# Patient Record
Sex: MALE | Race: BLACK OR AFRICAN AMERICAN | Employment: OTHER | URBAN - METROPOLITAN AREA
[De-identification: names, ages, dates, MRNs, and addresses within clinical notes are randomized per-mention and may not be internally consistent; named-entity substitution may affect disease eponyms.]

---

## 2018-06-07 ENCOUNTER — APPOINTMENT (OUTPATIENT)
Dept: GENERAL RADIOLOGY | Facility: HOSPITAL | Age: 63
DRG: 064 | End: 2018-06-07
Attending: EMERGENCY MEDICINE
Payer: MEDICARE

## 2018-06-07 ENCOUNTER — APPOINTMENT (OUTPATIENT)
Dept: CV DIAGNOSTICS | Facility: HOSPITAL | Age: 63
DRG: 064 | End: 2018-06-07
Attending: INTERNAL MEDICINE
Payer: MEDICARE

## 2018-06-07 ENCOUNTER — APPOINTMENT (OUTPATIENT)
Dept: CT IMAGING | Facility: HOSPITAL | Age: 63
DRG: 064 | End: 2018-06-07
Attending: INTERNAL MEDICINE
Payer: MEDICARE

## 2018-06-07 ENCOUNTER — APPOINTMENT (OUTPATIENT)
Dept: CT IMAGING | Facility: HOSPITAL | Age: 63
DRG: 064 | End: 2018-06-07
Attending: EMERGENCY MEDICINE
Payer: MEDICARE

## 2018-06-07 ENCOUNTER — APPOINTMENT (OUTPATIENT)
Dept: ULTRASOUND IMAGING | Facility: HOSPITAL | Age: 63
DRG: 064 | End: 2018-06-07
Attending: INTERNAL MEDICINE
Payer: MEDICARE

## 2018-06-07 ENCOUNTER — HOSPITAL ENCOUNTER (INPATIENT)
Facility: HOSPITAL | Age: 63
LOS: 14 days | Discharge: NURSING, REHAB OR LONG-TERM CARE | DRG: 064 | End: 2018-06-21
Attending: EMERGENCY MEDICINE | Admitting: INTERNAL MEDICINE
Payer: MEDICARE

## 2018-06-07 DIAGNOSIS — R53.1 RIGHT SIDED WEAKNESS: ICD-10-CM

## 2018-06-07 DIAGNOSIS — I61.9 LEFT-SIDED NONTRAUMATIC INTRACEREBRAL HEMORRHAGE, UNSPECIFIED CEREBRAL LOCATION (HCC): Primary | ICD-10-CM

## 2018-06-07 DIAGNOSIS — N17.9 AKI (ACUTE KIDNEY INJURY) (HCC): ICD-10-CM

## 2018-06-07 PROCEDURE — 93880 EXTRACRANIAL BILAT STUDY: CPT | Performed by: INTERNAL MEDICINE

## 2018-06-07 PROCEDURE — 70450 CT HEAD/BRAIN W/O DYE: CPT | Performed by: EMERGENCY MEDICINE

## 2018-06-07 PROCEDURE — 30233R1 TRANSFUSION OF NONAUTOLOGOUS PLATELETS INTO PERIPHERAL VEIN, PERCUTANEOUS APPROACH: ICD-10-PCS | Performed by: INTERNAL MEDICINE

## 2018-06-07 PROCEDURE — 99223 1ST HOSP IP/OBS HIGH 75: CPT | Performed by: OTHER

## 2018-06-07 PROCEDURE — 93306 TTE W/DOPPLER COMPLETE: CPT | Performed by: INTERNAL MEDICINE

## 2018-06-07 PROCEDURE — 99223 1ST HOSP IP/OBS HIGH 75: CPT | Performed by: INTERNAL MEDICINE

## 2018-06-07 PROCEDURE — 71045 X-RAY EXAM CHEST 1 VIEW: CPT | Performed by: EMERGENCY MEDICINE

## 2018-06-07 PROCEDURE — 71250 CT THORAX DX C-: CPT | Performed by: INTERNAL MEDICINE

## 2018-06-07 RX ORDER — SODIUM CHLORIDE 0.9 % (FLUSH) 0.9 %
10 SYRINGE (ML) INJECTION AS NEEDED
Status: DISCONTINUED | OUTPATIENT
Start: 2018-06-07 | End: 2018-06-21

## 2018-06-07 RX ORDER — AMLODIPINE BESYLATE 10 MG/1
10 TABLET ORAL DAILY
Status: DISCONTINUED | OUTPATIENT
Start: 2018-06-07 | End: 2018-06-11

## 2018-06-07 RX ORDER — HYDRALAZINE HYDROCHLORIDE 20 MG/ML
10 INJECTION INTRAMUSCULAR; INTRAVENOUS ONCE
Status: COMPLETED | OUTPATIENT
Start: 2018-06-07 | End: 2018-06-07

## 2018-06-07 RX ORDER — CARVEDILOL 12.5 MG/1
12.5 TABLET ORAL 2 TIMES DAILY WITH MEALS
COMMUNITY

## 2018-06-07 RX ORDER — FAMOTIDINE 20 MG/1
20 TABLET ORAL DAILY
Status: DISCONTINUED | OUTPATIENT
Start: 2018-06-07 | End: 2018-06-21

## 2018-06-07 RX ORDER — HYDRALAZINE HYDROCHLORIDE 50 MG/1
50 TABLET, FILM COATED ORAL 3 TIMES DAILY
Status: DISCONTINUED | OUTPATIENT
Start: 2018-06-07 | End: 2018-06-08

## 2018-06-07 RX ORDER — FAMOTIDINE 20 MG/1
20 TABLET ORAL DAILY
COMMUNITY

## 2018-06-07 RX ORDER — SODIUM CHLORIDE 0.9 % (FLUSH) 0.9 %
3 SYRINGE (ML) INJECTION AS NEEDED
Status: DISCONTINUED | OUTPATIENT
Start: 2018-06-07 | End: 2018-06-21

## 2018-06-07 RX ORDER — CARVEDILOL 12.5 MG/1
12.5 TABLET ORAL 2 TIMES DAILY WITH MEALS
Status: DISCONTINUED | OUTPATIENT
Start: 2018-06-07 | End: 2018-06-21

## 2018-06-07 RX ORDER — ATORVASTATIN CALCIUM 40 MG/1
40 TABLET, FILM COATED ORAL NIGHTLY
Status: DISCONTINUED | OUTPATIENT
Start: 2018-06-07 | End: 2018-06-21

## 2018-06-07 RX ORDER — POTASSIUM CHLORIDE 20 MEQ/1
40 TABLET, EXTENDED RELEASE ORAL DAILY
Status: DISCONTINUED | OUTPATIENT
Start: 2018-06-07 | End: 2018-06-16

## 2018-06-07 RX ORDER — SODIUM BICARBONATE 650 MG/1
650 TABLET ORAL 2 TIMES DAILY
Status: DISCONTINUED | OUTPATIENT
Start: 2018-06-07 | End: 2018-06-21

## 2018-06-07 RX ORDER — POTASSIUM CHLORIDE 20 MEQ/1
40 TABLET, EXTENDED RELEASE ORAL DAILY
COMMUNITY

## 2018-06-07 RX ORDER — HYDRALAZINE HYDROCHLORIDE 25 MG/1
25 TABLET, FILM COATED ORAL 3 TIMES DAILY
Status: DISCONTINUED | OUTPATIENT
Start: 2018-06-07 | End: 2018-06-07

## 2018-06-07 RX ORDER — LEVETIRACETAM 500 MG/1
500 TABLET ORAL 2 TIMES DAILY
Status: DISCONTINUED | OUTPATIENT
Start: 2018-06-07 | End: 2018-06-16

## 2018-06-07 RX ORDER — SODIUM CHLORIDE 9 MG/ML
INJECTION, SOLUTION INTRAVENOUS CONTINUOUS
Status: DISCONTINUED | OUTPATIENT
Start: 2018-06-07 | End: 2018-06-07

## 2018-06-07 RX ORDER — SODIUM CHLORIDE 450 MG/100ML
INJECTION, SOLUTION INTRAVENOUS CONTINUOUS
Status: DISCONTINUED | OUTPATIENT
Start: 2018-06-07 | End: 2018-06-08

## 2018-06-07 RX ORDER — LEVETIRACETAM 500 MG/1
500 TABLET ORAL 2 TIMES DAILY
COMMUNITY

## 2018-06-07 RX ORDER — ATORVASTATIN CALCIUM 40 MG/1
40 TABLET, FILM COATED ORAL NIGHTLY
COMMUNITY

## 2018-06-07 RX ORDER — ASPIRIN 81 MG/1
TABLET, CHEWABLE ORAL DAILY
Status: ON HOLD | COMMUNITY
End: 2018-06-11

## 2018-06-07 RX ORDER — SODIUM BICARBONATE 650 MG/1
650 TABLET ORAL 2 TIMES DAILY
COMMUNITY

## 2018-06-07 RX ORDER — AMLODIPINE BESYLATE 10 MG/1
10 TABLET ORAL DAILY
Status: ON HOLD | COMMUNITY
End: 2018-06-11

## 2018-06-07 RX ORDER — CHOLECALCIFEROL (VITAMIN D3) 1250 MCG
50000 CAPSULE ORAL WEEKLY
COMMUNITY

## 2018-06-07 RX ORDER — HYDRALAZINE HYDROCHLORIDE 25 MG/1
25 TABLET, FILM COATED ORAL 3 TIMES DAILY
Status: ON HOLD | COMMUNITY
End: 2018-06-11

## 2018-06-07 RX ORDER — SODIUM CHLORIDE 9 MG/ML
INJECTION, SOLUTION INTRAVENOUS ONCE
Status: COMPLETED | OUTPATIENT
Start: 2018-06-07 | End: 2018-06-07

## 2018-06-07 NOTE — PROGRESS NOTES
Tried to call guardian three times since 7:30 am, phone number in chart is to office, unable to leave voicemail because phone continues to ring and ring, no voicemail set [phone].

## 2018-06-07 NOTE — CM/SW NOTE
CARE MANAGEMENT    Discharge Planning Evaluation:  Saw pt at bedside sitting up in a chair, nonverbal. Awake, alert, nods yes to everything. Bedside RN asked me about obtaining records from [address].   Pt is reported nonverbal, admitted with

## 2018-06-07 NOTE — SLP NOTE
ADULT SWALLOWING EVALUATION    ASSESSMENT    ASSESSMENT/OVERALL IMPRESSION:  Pt seen sitting upright in bed for all PO trials for BSSE. Pt reportedly with hx of dysphagia of unknown extent.  Pt remained nonverbal during the session and gave nonverbal affirm Stroke protocol    Problem List  Principal Problem:    Left-sided nontraumatic intracerebral hemorrhage, unspecified cerebral location [facility])  Active Problems:    Right sided weakness    BLAS (acute kidney injury) [facility])      Past Medical History  Past Medica required to rule-out silent aspiration.)    Esophageal Phase of Swallow: No complaints consistent with possible esophageal involvement    GOALS  Goal #1 The patient will tolerate puree consistency and nectar liquids without overt signs or symptoms of aspir

## 2018-06-07 NOTE — ED PROVIDER NOTES
Patient Seen in: [facility] AND [facility] Emergency Department    History   Patient presents with:  Stroke (neurologic)    Stated Complaint: stroke     HPI    History is provided by EMS.     80-year-old male with history of CKD, communication disorder, GERD, HTN regular rhythm. No murmur heard. 2+ radial and DP pulses b/l, no leg swelling   Pulmonary/Chest: Effort normal and breath sounds normal. No stridor. No respiratory distress. He has no wheezes. He has no rales. Abdominal: Soft.  He exhibits no distensi BUN/CREA Ratio 8.6 (L) 10.0 - 20.0   Anion Gap 9 0 - 18 mmol/L   Calculated Osmolality 310 (H) 275 - 295 mOsm/kg   GFR, Non-African American 12 (L) >=60   GFR, -American 14 (L) >=60   -CBC W/ DIFFERENTIAL   Collection Time: 06/07/18  4:39 AM   Res ventriculomegaly. Case discussed with Nereyda De La Fuente RN, at 06/07/2018 at 05:52 Yo Beard M.D. This report has been electronically signed and verified by the Radiologist whose name is printed above.     DD:  06/07/2018/DT:  06/07/2018 Care Time:  Total critical care time for this patient was 50 minutes exclusive of separately billable procedures, but in obtaining history, performing a physical exam, bedside monitoring of interventions, collecting and interpreting test, and discussion wi

## 2018-06-07 NOTE — H&P
[address] Patient Status:  Inpatient    1955 MRN [id number]   [facility] 2W/SW Attending Yoli Epps, *   Hosp Day # 0 PCP Mia Jordan MD     Date:  2018 sodium bicarbonate 650 MG Oral Tab Take 650 mg by mouth 2 (two) times daily. Cholecalciferol (VITAMIN D3) 44384 units Oral Cap Take 50,000 Units by mouth once a week. atorvastatin 40 MG Oral Tab Take 40 mg by mouth nightly.    Potassium Chloride ER 20 loss with sequela of chronic microvascular ischemic disease. 5. There is anterior and posterior circulation large vessel atherosclerosis. 6. Lesser incidental findings as above.     A preliminary report was issued by the [address] [place] [address] Radiology teleradiology ser imaging. 4. Subpleural left lung micronodules under 4 mm or less in size, which are likely benign. 5. Cardiomegaly with coronary atherosclerosis. 6. Small hiatal hernia. 7. Probable cholelithiasis, which is only partially imaged.  8. Severe T6 as well as mi clinical documentation in H+P. Based on patients current state of illness, I anticipate that, after discharge, patient will require TBD.       Arturo Schaefer MD  6/7/2018

## 2018-06-07 NOTE — CM/SW NOTE
SW informed the pt is from [facility] [phone]. [facility] has requested clinical information and the advance directive/guardianship papers for the pt.      ANNA placed call to [place] [address] office [phone] who confirmed Kurtis Becerra as the pt's Publ

## 2018-06-07 NOTE — PROGRESS NOTES
[facility]    Report of Consultation    Date of Admission:  6/7/2018  Date of Consult:  6/7/2018     Reason for Consultation:     BLAS and Uncontrolled HTN     History of Present Illness:     Ludivina Nicole is a 61 yrs male with pmh of CKD sta levETIRAcetam (KEPPRA) tab 500 mg 500 mg Oral BID   Potassium Chloride ER (K-DUR M20) CR tab 40 mEq 40 mEq Oral Daily   sodium bicarbonate tab 650 mg Oral BID   Normal Saline Flush 0.9 % injection 3 mL 3 mL Intravenous PRN   Normal Saline Flush 0.9 % inj hours.     Lab Results  Component Value Date   COLORUR Yellow 06/07/2018   CLARITY Hazy 06/07/2018   SPECGRAVITY 1.010 06/07/2018   GLUUR 50  06/07/2018   BILUR Negative 06/07/2018   KETUR Negative 06/07/2018   BLOODURINE Negative 06/07/2018   PHURINE 7.0 0

## 2018-06-07 NOTE — CONSULTS
[facility]    Neurosurgery Consultation    John Crews Patient Status:  Inpatient    1955 MRN [id number]   Location [facility] 2W/SW Attending Heladio Gambino, *   Hosp Day # 0 PCP Bill Vidales MD     Date of with no rebound or guarding. No peritoneal signs. Extremities:  Non-tender, no lower extremity edema noted.       Labs:    Lab Results  Component Value Date   WBC 6.7 06/07/2018   HGB 13.0 (L) 06/07/2018    06/07/2018   BUN 40 (H) 06/07/2018   N hypertensive left thalamic ICH, nonoperative. Recommend management per neurology, BP management. Because of ASA use, we recommend platelet transfusion. No neurosurgical issues at this time.   Repeat CTH early am.    More than 60 minutes were spent in con

## 2018-06-07 NOTE — PLAN OF CARE
Problem: CARDIOVASCULAR - ADULT  Goal: Maintains optimal cardiac output and hemodynamic stability  INTERVENTIONS:  - Monitor vital signs, rhythm, and trends  - Monitor for bleeding, hypotension and signs of decreased cardiac output  - Evaluate effectivenes FALL  Goal: Free from fall injury  INTERVENTIONS:  - Assess pt frequently for physical needs  - Identify cognitive and physical deficits and behaviors that affect risk of falls.   - [place] fall precautions as indicated by assessment.  - Educate pt/family

## 2018-06-07 NOTE — CONSULTS
[facility]    Consult Note    Date:  6/7/2018  Date of Admission:  6/7/2018      Chief Complaint:   Quentin Healy is a(n) 61year old male with intracranial hemorrhage.     HPI:   The patient lives at [place] [address] and has significant debil levETIRAcetam 500 MG Oral Tab Take 500 mg by mouth 2 (two) times daily. Review of Systems:   Cannot obtain    Physical Exam:   Vital Signs:  Blood pressure 133/72, pulse 85, temperature 98.1 °F (36.7 °C), resp.  rate 18, weight 137 lb 12.6 oz (62.5 left lung. Close followup suggested with option for CT. 4. Demineralization. 5. Scoliosis. 6. Osteoarthritis. 7. A preliminary report was submitted and there are no major discrepancies. Assessment/Plan:   1.   Intracranial hemorrhage–likely hype

## 2018-06-07 NOTE — CONSULTS
Neurology Inpatient Consult Note    Jillian Alas : 1955   Referring Physician: Dr. Ilir Figueroa  HPI:     Jillian Alas is a 61year old male who is being seen in neurologic evaluation. Patient being seen in evaluation for intracranial hemorrhage.   He Unable to accurately assess  Reflexes: DTRs 3+ in bilateral biceps, brachioradialis, patella, toes upgoing bilaterally  Coordination / gait: Unable to assess    IMAGING / STUDIES:  reviewed   Noncontrast head CT 6/7/18, images and report reviewed  CONCLUSI

## 2018-06-08 ENCOUNTER — APPOINTMENT (OUTPATIENT)
Dept: GENERAL RADIOLOGY | Facility: HOSPITAL | Age: 63
DRG: 064 | End: 2018-06-08
Attending: INTERNAL MEDICINE
Payer: MEDICARE

## 2018-06-08 ENCOUNTER — APPOINTMENT (OUTPATIENT)
Dept: CT IMAGING | Facility: HOSPITAL | Age: 63
DRG: 064 | End: 2018-06-08
Attending: Other
Payer: MEDICARE

## 2018-06-08 PROCEDURE — 71045 X-RAY EXAM CHEST 1 VIEW: CPT | Performed by: INTERNAL MEDICINE

## 2018-06-08 PROCEDURE — 70450 CT HEAD/BRAIN W/O DYE: CPT | Performed by: OTHER

## 2018-06-08 PROCEDURE — 99233 SBSQ HOSP IP/OBS HIGH 50: CPT | Performed by: INTERNAL MEDICINE

## 2018-06-08 PROCEDURE — 99232 SBSQ HOSP IP/OBS MODERATE 35: CPT | Performed by: OTHER

## 2018-06-08 RX ORDER — DEXTROSE MONOHYDRATE 50 MG/ML
INJECTION, SOLUTION INTRAVENOUS CONTINUOUS
Status: DISCONTINUED | OUTPATIENT
Start: 2018-06-08 | End: 2018-06-12

## 2018-06-08 RX ORDER — SPIRONOLACTONE 25 MG/1
25 TABLET ORAL DAILY
Status: DISCONTINUED | OUTPATIENT
Start: 2018-06-08 | End: 2018-06-14

## 2018-06-08 RX ORDER — HYDRALAZINE HYDROCHLORIDE 20 MG/ML
10 INJECTION INTRAMUSCULAR; INTRAVENOUS ONCE
Status: COMPLETED | OUTPATIENT
Start: 2018-06-08 | End: 2018-06-08

## 2018-06-08 RX ORDER — LABETALOL HYDROCHLORIDE 5 MG/ML
10 INJECTION, SOLUTION INTRAVENOUS
Status: DISCONTINUED | OUTPATIENT
Start: 2018-06-08 | End: 2018-06-21

## 2018-06-08 NOTE — OCCUPATIONAL THERAPY NOTE
OCCUPATIONAL THERAPY EVALUATION - INPATIENT     Room Number: 325/325-A  Evaluation Date: 6/8/2018  Type of Evaluation: Initial  Presenting Problem: L sided ICH     Physician Order: IP Consult to Occupational Therapy  Reason for Therapy: ADL/IADL Dysfunctio unable to report PLOF; per RN, patient is close to his cognitive baseline. Patient resides at [facility] and has 24/7 care and spv.  Continued skilled OT in supervised setting is recommended at this time; will adjust OT POC as appropriate and as furth ASSESSMENT  Rating: Unable to rate          ACTIVITY TOLERANCE  Room air    COGNITION  Overall Cognitive Status:  Impaired  Attention Span:  difficulty attending to directions  Following Commands:  follows one step commands with increased time and follows (sit to supine = min A)  Sit to Stand:  (mod A x2)  Toilet Transfer: NT  Shower Transfer: NT  Chair Transfer: mod A x2    Bedroom Mobility: mod A x2 and RW    BALANCE ASSESSMENT  Static Sitting: fair  Dynamic Sitting: fair-  Static Standing: poor+  Dynamic

## 2018-06-08 NOTE — PROGRESS NOTES
[facility] - [facility]    Progress Note      Subjective:     Patient sitting comfortably - ate almost 80% of his meal    Review of Systems:     Unable to obtain - non verbal     Objective:   Temp:  [96.4 °F (35.8 °C)-99.4 °F (37.4 °C)] 96.4 °F (35.8 13.0*  11.2*   HCT  37.9*  32.4*   MCV  93.0  92.5   WBC  6.7  7.3   PLT  224  236     Recent Labs   Lab  06/07/18   0439  06/08/18   0425   GLU  104*  109*   BUN  40*  41*   CREATSERUM  4.66*  4.42*   GFRAA  14*  15*   GFRNAA  12*  13*   CA  9.1  8.5   NA disease. 5. There is anterior and posterior circulation large vessel atherosclerosis. 6. Lesser incidental findings as above. A preliminary report was issued by the [address] [place] [address] Radiology teleradiology service. There are no major discrepancies.    Dictated b have represented gynecomastia. 3. Additional linear airspace opacities in both lower lobes with configuration most suggestive of atelectasis. Bibasilar peribronchial thickening raises suspicion for component of bronchitis.  These findings can be reassessed attempt to weanoff   - on coreg, amlodipine and hydralazine at home. Added spironolactone to 25 mg daily dose. Increase hydralazine to home dose of 75 mg TID      Discussed with Nursing. Records reviewed from [facility]. Patient was seeing a nephrologist - cont. t

## 2018-06-08 NOTE — PLAN OF CARE
CARDIOVASCULAR - ADULT    • Maintains optimal cardiac output and hemodynamic stability Progressing    • Absence of cardiac arrhythmias or at baseline Progressing    Monitor reveals NSR, BP is normotensive.      NEUROLOGICAL - ADULT    • Achieves stable or i

## 2018-06-08 NOTE — CM/SW NOTE
Clinical updates sent via [email or web address] to [facility] [phone].     KVNG koenig [id number]

## 2018-06-08 NOTE — PHYSICAL THERAPY NOTE
PHYSICAL THERAPY EVALUATION - INPATIENT     Room Number: 463/700-R  Evaluation Date: 6/8/2018  Type of Evaluation: Initial   Physician Order: PT Eval and Treat    Presenting Problem: p/w R sided weakness  Reason for Therapy: Mobility Dysfunction and Disch follow and modify recommendations as appropriate. Currently, PT recommendation sub-acute rehab upon d/c from hospital.     Patient will benefit from continued IP PT services to address these deficits in preparation for discharge.     DISCHARGE RECOMMENDATIO Unable to rate     COGNITION  · Overall Cognitive Status:  Impaired  · Memory:  unable to assess  · Following Commands:  follows one-step commands inconsistently, follows multi-step commands inconsistently, follows one step commands with increased time and from lying on back to sitting on the side of the bed?: A Lot   How much help from another person does the patient currently need. ..   -   Moving to and from a bed to a chair (including a wheelchair)?: A Lot   -   Need to walk in hospital room?: A Lot   - Patient to demonstrate independence with home activity/exercise instructions provided to patient in preparation for discharge.    Goal #4   Current Status    Goal #5    Goal #5   Current Status    Goal #6    Goal #6  Current Status

## 2018-06-08 NOTE — PROGRESS NOTES
[facility]    NEUROSURGERY PROGRESS NOTE    Fran Cooney Patient Status:  Inpatient    1955 MRN [id number]   [facility] 2W/SW Attending Sen Gonzalez, *   Hosp Day # 1 PCP Leeanne Zhao MD       S: 6. Lesser incidental findings as above. A preliminary report was issued by the [address] [place] [address] Radiology teleradiology service. There are no major discrepancies.    Dictated by (CST): Lance Dandy, MD on 6/07/2018 at 6:21     Approved by (CST): Lance Dandy, lobes with configuration most suggestive of atelectasis. Bibasilar peribronchial thickening raises suspicion for component of bronchitis. These findings can be reassessed at the time of anticipated followup imaging.  4. Subpleural left lung micronodules und

## 2018-06-08 NOTE — PROGRESS NOTES
Neurology Inpatient Follow-up Note      HPI:     Pt being seen in follow up. Interval notes reviewed.        Past Medical Hisotory:  Reviewed    Medications:  Reviewed    Allergies:    Dilantin [Phenytoin]    UNKNOWN      ROS:   Unable to obtain from percy Left atrium: Bubble study is inconclusive. No previous study was available for comparison. LDL 78    ASSESSMENT/PLAN:   Intracranial hemorrhage  Left thalamus/basal ganglia.   Most consistent with hypertensive hemorrhage due to location and clinical c

## 2018-06-08 NOTE — PROGRESS NOTES
[facility]     Progress Note        Jillian Alas Patient Status:  Inpatient    1955 MRN [id number]   Location [facility] 2W/SW Attending Joe Saini, *   Hosp Day # 1 PCP Mohan Reyes MD       Subjective: 06/08/2018   HGB 11.2 06/08/2018   HCT 32.4 06/08/2018    06/08/2018   CREATSERUM 4.42 06/08/2018   BUN 41 06/08/2018    06/08/2018   K 3.8 06/08/2018    06/08/2018   CO2 21 06/08/2018    06/08/2018   CA 8.5 06/08/2018   INR 1.0 0 Portable  (cpt=71045)    Result Date: 6/8/2018  CONCLUSION:  1. Mild cardiomegaly. Tortuous aorta 2.  Perihilar and bibasilar subsegmental atelectasis versus multifocal parenchymal opacification    Dictated by (CST): María Pinedo MD on 6/08/2018 at 7 of comparison imaging. They do, however, demonstrate nonacute CT features. 9. Lesser incidental findings as above.      Dictated by (CST): Montrell Ivy MD on 6/07/2018 at 13:18     Approved by (CST): Montrell Ivy MD on 6/07/2018 at 13:34            Ek

## 2018-06-09 PROCEDURE — 99232 SBSQ HOSP IP/OBS MODERATE 35: CPT | Performed by: INTERNAL MEDICINE

## 2018-06-09 PROCEDURE — 99233 SBSQ HOSP IP/OBS HIGH 50: CPT | Performed by: INTERNAL MEDICINE

## 2018-06-09 PROCEDURE — 99233 SBSQ HOSP IP/OBS HIGH 50: CPT | Performed by: OTHER

## 2018-06-09 RX ORDER — 0.9 % SODIUM CHLORIDE 0.9 %
VIAL (ML) INJECTION
Status: DISPENSED
Start: 2018-06-09 | End: 2018-06-10

## 2018-06-09 NOTE — PROGRESS NOTES
[facility]    Progress Note      Assessment and Plan:   1. Intracranial hemorrhage–likely hypertensive. The patient was on aspirin. The patient is a little bit more awake and more interactive. The carotid ultrasound was negative.   The e 06/09/2018   CREATSERUM 4.20 06/09/2018   BUN 36 06/09/2018    06/09/2018   K 3.6 06/09/2018    06/09/2018   CO2 23 06/09/2018    06/09/2018   CA 9.0 06/09/2018     CT scan the chest–1.  Groundglass density nodular opacities in the left u

## 2018-06-09 NOTE — CM/SW NOTE
6/10 848am- PMR is recommending subacute rehab. [facility]    ------------------------------  6/9 1110am- MD order received regarding discharge planning. The pt.  Was admitted from [facility] and plan is to return when medically

## 2018-06-09 NOTE — PROGRESS NOTES
[facility]    Progress Note    Isak Menard Patient Status:  Inpatient    1955 MRN [id number]   Location [facility] 3W/SW Attending Johnnie La, *   Hosp Day # 2 PCP Chintan Ying MD       Subjective:   Diego Payor pain or [place]; no palpitations   GI: denies nausea, vomiting, constipation, diarrhea; no heartburn  GENITAL/: no dysuria, urgency or frequency; no nocturia  MUSCULOSKELETAL: no joint complaints upper or lower extremities  PSYCHE:no depression or anxiety  N have represented gynecomastia. 3. Additional linear airspace opacities in both lower lobes with configuration most suggestive of atelectasis. Bibasilar peribronchial thickening raises suspicion for component of bronchitis.  These findings can be reassessed

## 2018-06-09 NOTE — CONSULTS
[facility] Patient Status:  Inpatient    1955 MRN [id number]   Location [facility] 3W/SW Attending Rocco Phillip, *   Hosp Day # 2 PCP Kimberly Astudillo MD     Patient Identification  Marc Gutierrez is a 61 year Doppler parameters are consistent with abnormal left ventricular     relaxation (grade 1 diastolic dysfunction). 2. Mitral valve: Mild regurgitation. 3. Left atrium: Bubble study is inconclusive    Seen by SLP.  On Pureed/thins    Physiatry consult obtain Normal Saline Flush 0.9 % injection 10 mL 10 mL Intravenous PRN   [COMPLETED] 0.9%  NaCl infusion  Intravenous Once   [COMPLETED] hydrALAzine HCl (APRESOLINE) injection 10 mg 10 mg Intravenous Once          Smoking status: Unknown If Ever Smoked    Smoke enlarged. Bowel sounds present. Musculoskeletal:     Right Upper Extremity:  Strength is 3-4. ROM WNL. Left Upper Extremity:  Strength is 4-5. ROM WNL. Right Lower Extremity:  Strength  is 3-4. ROM WNL.    Left Lower Extremity: [facility]

## 2018-06-09 NOTE — PLAN OF CARE
CARDIOVASCULAR - ADULT    • Maintains optimal cardiac output and hemodynamic stability Progressing    • Absence of cardiac arrhythmias or at baseline Progressing        HEMATOLOGIC - ADULT    • Free from bleeding injury Progressing        NEUROLOGICAL - AD

## 2018-06-10 ENCOUNTER — APPOINTMENT (OUTPATIENT)
Dept: ULTRASOUND IMAGING | Facility: HOSPITAL | Age: 63
DRG: 064 | End: 2018-06-10
Attending: INTERNAL MEDICINE
Payer: MEDICARE

## 2018-06-10 PROCEDURE — 76770 US EXAM ABDO BACK WALL COMP: CPT | Performed by: INTERNAL MEDICINE

## 2018-06-10 PROCEDURE — 99233 SBSQ HOSP IP/OBS HIGH 50: CPT | Performed by: OTHER

## 2018-06-10 PROCEDURE — 99232 SBSQ HOSP IP/OBS MODERATE 35: CPT | Performed by: INTERNAL MEDICINE

## 2018-06-10 NOTE — PROGRESS NOTES
[facility]    Progress Note    Quentin Healy Patient Status:  Inpatient    1955 MRN [id number]   Location [facility] 3W/SW Attending Kiara Frost, *   Hosp Day # 2 PCP Luz Foreman MD       Subjective:   Amador Khalil ROM all extremities good strength  no deformities  Extremities: no edema, cyanosis  Neurological:  Poor speech, R sided weakness. Results:     Laboratory Data:    Lab Results  Component Value Date   WBC 6.6 06/09/2018   HGB 11.8 (L) 06/09/2018   HCT 34.

## 2018-06-10 NOTE — PROGRESS NOTES
[facility]    Progress Note    Jillian Alas Patient Status:  Inpatient    1955 MRN [id number]   Location [facility] 3W/SW Attending Joe Saini, *   Hosp Day # 3 PCP Mohan Reyes MD       SUBJECTIVE:  Non Oral BID with meals   famoTIDine (PEPCID) tab 20 mg 20 mg Oral Daily   levETIRAcetam (KEPPRA) tab 500 mg 500 mg Oral BID   Potassium Chloride ER (K-DUR M20) CR tab 40 mEq 40 mEq Oral Daily   sodium bicarbonate tab 650 mg Oral BID   Normal Saline Flush 0.9

## 2018-06-10 NOTE — OCCUPATIONAL THERAPY NOTE
OCCUPATIONAL THERAPY TREATMENT NOTE - INPATIENT        Room Number: 325/325-A           Presenting Problem: L sided ICH     Problem List  Principal Problem:    Left-sided nontraumatic intracerebral hemorrhage, unspecified cerebral location [facility])  Active Pr Recommendations: TBD     PLAN  OT Treatment Plan: Balance activities; Energy conservation/work simplification techniques;ADL training;Functional transfer training;UE strengthening/ROM; Patient/Family education;Patient/Family training    SUBJECTIVE  Non-verba functional transfer with min A. Comment: mod A x2    Patient will complete OFH task sitting at EOB w/ set-up A. Comment: NT    Patient will tolerate standing for 4 minutes in prep for adls with min A.     Comment: ~2 minutes of standing at a time w/ mod

## 2018-06-10 NOTE — PLAN OF CARE
Problem: CARDIOVASCULAR - ADULT  Goal: Maintains optimal cardiac output and hemodynamic stability  INTERVENTIONS:  - Monitor vital signs, rhythm, and trends  - Monitor for bleeding, hypotension and signs of decreased cardiac output  - Evaluate effectivenes oral hygiene regimen  - Implement oral medicated treatments as ordered   Outcome: Progressing  Oral care provided q2h and prn. Oral hydration offered q1h.     Problem: NEUROLOGICAL - ADULT  Goal: Achieves stable or improved neurological status  INTERVENTION that affect risk of falls.   - [place] fall precautions as indicated by assessment.  - Educate pt/family on patient safety including physical limitations  - Instruct pt to call for assistance with activity based on assessment  - Modify environment to redu

## 2018-06-10 NOTE — PROGRESS NOTES
[facility]    Progress Note    Marva Mohs Patient Status:  Inpatient    1955 MRN [id number]   Location [facility] 3W/SW Attending Jose Echols, *   Hosp Day # 3 PCP Girish Perez MD       SUBJECTIVE:  More 12.5 mg Oral BID with meals   famoTIDine (PEPCID) tab 20 mg 20 mg Oral Daily   levETIRAcetam (KEPPRA) tab 500 mg 500 mg Oral BID   Potassium Chloride ER (K-DUR M20) CR tab 40 mEq 40 mEq Oral Daily   sodium bicarbonate tab 650 mg Oral BID   Normal Saline Fl

## 2018-06-10 NOTE — PROGRESS NOTES
[facility]     Progress Note        Paty Lyons Patient Status:  Inpatient    1955 MRN [id number]   Location [facility] 2W/SW Attending Gladys Watson, *   Hosp Day # 3 PCP Rajwinder Dennis MD       Subjective: 6.4 06/10/2018   HGB 11.7 06/10/2018   HCT 34.1 06/10/2018    06/10/2018   CREATSERUM 4.16 06/10/2018   BUN 38 06/10/2018    06/10/2018   K 3.7 06/10/2018    06/10/2018   CO2 24 06/10/2018    06/10/2018   CA 8.9 06/10/2018   ALB 2

## 2018-06-10 NOTE — PROGRESS NOTES
[place] [facility]    Progress Note    Quentin Healy Patient Status:  Inpatient    1955 MRN [id number]   Location [facility] 3W/SW Attending Kiara Frost, *   Hosp Day # 3 PCP Luz Foreman MD       SUBJECTIVE:  More 12.5 mg Oral BID with meals   famoTIDine (PEPCID) tab 20 mg 20 mg Oral Daily   levETIRAcetam (KEPPRA) tab 500 mg 500 mg Oral BID   Potassium Chloride ER (K-DUR M20) CR tab 40 mEq 40 mEq Oral Daily   sodium bicarbonate tab 650 mg Oral BID   Normal Saline Fl

## 2018-06-10 NOTE — PROGRESS NOTES
[facility]    Progress Note    Jillian Alas Patient Status:  Inpatient    1955 MRN [id number]   Location [facility] 3W/SW Attending Joe Saini, *   Hosp Day # 3 PCP Mohan Reyes MD       Subjective:   Hershal Adjutant urgency or frequency; no nocturia  MUSCULOSKELETAL: no joint complaints upper or lower extremities  PSYCHE:no depression or anxiety  NEURO: As in HPI    Results:     Lab Results  Component Value Date   WBC 6.4 06/10/2018   HGB 11.7 (L) 06/10/2018   HCT 34.

## 2018-06-10 NOTE — PHYSICAL THERAPY NOTE
PHYSICAL THERAPY TREATMENT NOTE - INPATIENT     Room Number: 325/325-A           Problem List  Principal Problem:    Left-sided nontraumatic intracerebral hemorrhage, unspecified cerebral location [facility])  Active Problems:    Right sided weakness    BLAS (acu Moving from lying on back to sitting on the side of the bed?: A Lot   How much help from another person does the patient currently need. ..   -   Moving to and from a bed to a chair (including a wheelchair)?: A Lot   -   Need to walk in hospital room?: A Lo mod A x 2  New goal:  Pt to ambulate 5' bed<>chair with R.W and Min A   Goal #4 Patient to demonstrate independence with home activity/exercise instructions provided to patient in preparation for discharge.    Goal #4   Current Status  AAROM required today

## 2018-06-11 ENCOUNTER — APPOINTMENT (OUTPATIENT)
Dept: CT IMAGING | Facility: HOSPITAL | Age: 63
DRG: 064 | End: 2018-06-11
Attending: UROLOGY
Payer: MEDICARE

## 2018-06-11 PROCEDURE — 99233 SBSQ HOSP IP/OBS HIGH 50: CPT | Performed by: OTHER

## 2018-06-11 PROCEDURE — 99233 SBSQ HOSP IP/OBS HIGH 50: CPT | Performed by: INTERNAL MEDICINE

## 2018-06-11 PROCEDURE — 99223 1ST HOSP IP/OBS HIGH 75: CPT | Performed by: UROLOGY

## 2018-06-11 PROCEDURE — 74176 CT ABD & PELVIS W/O CONTRAST: CPT | Performed by: UROLOGY

## 2018-06-11 RX ORDER — SPIRONOLACTONE 25 MG/1
25 TABLET ORAL DAILY
Qty: 30 TABLET | Refills: 0 | Status: SHIPPED | OUTPATIENT
Start: 2018-06-11

## 2018-06-11 RX ORDER — NIFEDIPINE 60 MG/1
60 TABLET, EXTENDED RELEASE ORAL DAILY
Status: DISCONTINUED | OUTPATIENT
Start: 2018-06-11 | End: 2018-06-19

## 2018-06-11 RX ORDER — HYDRALAZINE HYDROCHLORIDE 25 MG/1
75 TABLET, FILM COATED ORAL 3 TIMES DAILY
Qty: 30 TABLET | Refills: 0 | Status: SHIPPED | OUTPATIENT
Start: 2018-06-11

## 2018-06-11 RX ORDER — NIFEDIPINE 60 MG/1
60 TABLET, FILM COATED, EXTENDED RELEASE ORAL DAILY
Qty: 30 TABLET | Refills: 0 | Status: SHIPPED | OUTPATIENT
Start: 2018-06-11

## 2018-06-11 NOTE — CONSULTS
[facility] [place] [facility]  Male Inpatient Consult    Amy Mckeon Patient Status:  Inpatient    1955 MRN [id number]   Location [facility] 3W/SW Attending Srikanth Dejesus.  [address] Day # 4 PCP Consuelo Rocha MD     DATE OF ADMISSION: 2018    URO spironolactone (ALDACTONE) tab 25 mg 25 mg Oral Daily   hydrALAzine HCl (APRESOLINE) tab 75 mg 75 mg Oral TID   Labetalol HCl (TRANDATE) injection 10 mg 10 mg Intravenous Q1H PRN   atorvastatin (LIPITOR) tab 40 mg 40 mg Oral Nightly   carvedilol (COREG) auscultation and percussion. Heart is regular rate and rhythm. The peripheral vascular exam shows pulses 2+ bilaterally symmetrical without bruits. Flank is without mass or tenderness.   Abdomen is soft, nontender, normal active bowel sounds with no orga slightly worsening BUN and creatinine 38 4.16      BLAS (acute kidney injury) ([address]  Over baseline creatinine 3.6 would recommend hydration would think patient is a very poor surgical candidate      Chronic kidney disease (CKD), stage IV (severe) ([address]  As

## 2018-06-11 NOTE — PHYSICAL THERAPY NOTE
PHYSICAL THERAPY TREATMENT NOTE - INPATIENT     Room Number: 325/325-A           Problem List  Principal Problem:    Left-sided nontraumatic intracerebral hemorrhage, unspecified cerebral location [facility])  Active Problems:    Right sided weakness    BLAS (acu fearful of falling, poor ambulation tolerance due to weakness and difficulty advancing R LE, as well as need for assist for hand placement during transfers.    Recommend Subacute rehab to further improve mobility and maximize his functional potential.    JOY advance his RLe). Assistive Device: Rolling walker  Pattern:  (difficulty advancing RLE, small shuffling steps)  Stoop/Curb Assistance: Not tested     Bed mobility  Mod A supine to sit.      Transfers  Mod A x 2 sit to stand from bed and chair      Patie

## 2018-06-11 NOTE — PROGRESS NOTES
[facility]    Progress Note    Ana Cristina Luna Patient Status:  Inpatient    1955 MRN [id number]   Location [facility] 3W/SW Attending Ayleen Quintanilla, *   Hosp Day # 4 PCP Juanjo Nazario MD       SUBJECTIVE:  [facility] atorvastatin (LIPITOR) tab 40 mg 40 mg Oral Nightly   carvedilol (COREG) tab 12.5 mg 12.5 mg Oral BID with meals   famoTIDine (PEPCID) tab 20 mg 20 mg Oral Daily   levETIRAcetam (KEPPRA) tab 500 mg 500 mg Oral BID   Potassium Chloride ER (K-DUR M20) CR t

## 2018-06-11 NOTE — PROGRESS NOTES
[facility]    Progress Note    Fran Cooney Patient Status:  Inpatient    1955 MRN [id number]   Location [facility] 3W/SW Attending Sen Gonzalez, *   Hosp Day # 3 PCP Leeanne Zhao MD       Subjective:   Yamile Hoffman noted  Musculoskeletal: full ROM all extremities good strength  no deformities  Extremities:strength deficits R sided  Neurological: as above    Results:     Laboratory Data:    Lab Results  Component Value Date   WBC 6.4 06/10/2018   HGB 11.7 (L) 06/10/20

## 2018-06-11 NOTE — PROGRESS NOTES
[facility]    Progress Note      Subjective:     Patient sitting comfortably - ate almost 80% of his meal    Review of Systems:     Unable to obtain - non verbal     Objective:   Temp:  [97.9 °F (36.6 °C)-99.8 °F (37.7 °C)] 98.2 °F (36.8 HCT  34.7*  34.1*  34.5*   MCV  93.1  92.1  92.5   WBC  6.6  6.4  6.0   PLT  217  196  201     Recent Labs   Lab  06/09/18   0618  06/10/18   0600  06/11/18   0535   GLU  132*  121*  115*   BUN  36*  38*  43*   CREATSERUM  4.20*  4.16*  4.16*   GFRAA  16 1.4 cm, similar to prior exam  - neurosurgery input noted - no surgery   - neurology input noted  - goal to keep BP <140/90 mmhg  - on coreg, amlodipine and hydralazine at home. Added spironolactone to 25 mg daily dose.     Discussed with Nursing.  Paged pr

## 2018-06-12 PROCEDURE — 99233 SBSQ HOSP IP/OBS HIGH 50: CPT | Performed by: INTERNAL MEDICINE

## 2018-06-12 PROCEDURE — 99232 SBSQ HOSP IP/OBS MODERATE 35: CPT | Performed by: UROLOGY

## 2018-06-12 RX ORDER — HYDRALAZINE HYDROCHLORIDE 100 MG/1
100 TABLET, FILM COATED ORAL 3 TIMES DAILY
Status: DISCONTINUED | OUTPATIENT
Start: 2018-06-12 | End: 2018-06-21

## 2018-06-12 NOTE — PROGRESS NOTES
[facility]    Progress Note    Ludivina Nicole Patient Status:  Inpatient    1955 MRN [id number]   Location [facility] 3W/SW Attending Velma Mckinley, *   Hosp Day # 5 PCP Nehemiah MD Matt       SUBJECTIVE:  No s Flush 0.9 % injection 10 mL 10 mL Intravenous PRN          Assessment/Plan:         Acute intracranial hemorrhage  CT head: Acute left thalamic intraparenchymal hemorrhage with surrounding vasogenic edema. No mass-effect or shift of midline structure.

## 2018-06-12 NOTE — PROGRESS NOTES
[facility] Patient Status:  Inpatient    1955 MRN [id number]   Location [facility] 3W/SW Attending Faye Foss. [address] Day # 5 PCP Ibrahima Dougherty MD       Lizzy Benjamin is a 61year old male patient.     HPI: Mercedes mg 10 mg Intravenous Q1H PRN   atorvastatin (LIPITOR) tab 40 mg 40 mg Oral Nightly   carvedilol (COREG) tab 12.5 mg 12.5 mg Oral BID with meals   famoTIDine (PEPCID) tab 20 mg 20 mg Oral Daily   levETIRAcetam (KEPPRA) tab 500 mg 500 mg Oral BID   Potassium evaluated. Nondependent air within the bladder presumably reflects instrumentation. 5. Streak artifact from bilateral hip arthroplasties limits assessment of the pelvic structures. 6. Cholelithiasis. 7. Mild fecal impaction in the rectum.  8. Coronary ather MD  6/12/2018

## 2018-06-12 NOTE — PROGRESS NOTES
Attempted x3 to contact patient's state guardian, Joceline Bui. Left VM and have not received call back. Called [facility] and they have no other contact for patient listed.

## 2018-06-12 NOTE — CM/SW NOTE
Explanation of of BPCI/Medicare program provided. Patient was enrolled under [facility]. Patient/family agreed to phone f/u for 3 months from [address] after discharge from [address] [place] [address]. BPCI/Medicare letter and brochure provided.     Lives at [facility]    Attempted

## 2018-06-12 NOTE — PROGRESS NOTES
[facility]    Progress Note      Subjective:     Patient sitting comfortably - ate almost 80% of his meal    Review of Systems:     Unable to obtain - non verbal     Objective:   Temp:  [97.6 °F (36.4 °C)-98.8 °F (37.1 °C)] 97.9 °F (36.6 92.5   WBC  6.6  6.4  6.0   PLT  217  196  201     Recent Labs   Lab  06/09/18   0618  06/10/18   0600  06/11/18   0535   GLU  132*  121*  115*   BUN  36*  38*  43*   CREATSERUM  4.20*  4.16*  4.16*   GFRAA  16*  16*  16*   GFRNAA  14*  14*  14*   CA  9.0 similar to prior exam  - neurosurgery input noted - no surgery   - neurology input noted  - goal to keep BP <140/90 mmhg  - on coreg, amlodipine and hydralazine at home. Added spironolactone to 25 mg daily dose.  Increased hydralazine to 100 mg TID      Dis

## 2018-06-12 NOTE — OCCUPATIONAL THERAPY NOTE
OCCUPATIONAL THERAPY TREATMENT NOTE - INPATIENT        Room Number: 325/325-A       Presenting Problem: L sided ICH     Problem List  Principal Problem:    Left-sided nontraumatic intracerebral hemorrhage, unspecified cerebral location [facility])  Active Proble Recommendations: TBD     PLAN  OT Treatment Plan: Balance activities; ADL training;Functional transfer training;UE strengthening/ROM; Endurance training;Patient/Family education;Patient/Family training; Compensatory technique education    SUBJECTIVE  Smiled a addressed; Alarm set    OT Goals:      Patient will complete functional transfer with min A. Comment: mod A x1, min A x1    Patient will complete OFH task sitting at EOB w/ set-up A.    Comment: NT    Patient will tolerate standing for 4 minutes in prep fo

## 2018-06-12 NOTE — PROGRESS NOTES
[facility]    Progress Note    Paty Lyons Patient Status:  Inpatient    1955 MRN [id number]   Location [facility] 3W/SW Attending Gladys Watson, *   Hosp Day # 4 PCP Rajwinder Dennis MD       Subjective:   Philip Stephen heartburn  GENITAL/: no dysuria, urgency or frequency; no nocturia  MUSCULOSKELETAL: no joint complaints upper or lower extremities  PSYCHE:no depression or anxiety  NEURO: As in HPI    Results:     Lab Results  Component Value Date   WBC 6.0 06/11/2018 superimpose pneumonia. 10. Mild chronic-appearing T10 vertebral body compression deformity. 11. Lesser incidental findings as above.      Dictated by (CST): Roland Talley MD on 6/11/2018 at 16:56     Approved by (CST): Roland Talley MD on 6/11/2018 at 1

## 2018-06-12 NOTE — PHYSICAL THERAPY NOTE
PHYSICAL THERAPY TREATMENT NOTE - INPATIENT     Room Number: 325/325-A       Problem List  Principal Problem:    Left-sided nontraumatic intracerebral hemorrhage, unspecified cerebral location [facility])  Active Problems:    Right sided weakness    BLAS (acute k difficulty advancing R LE, as well as need for assist for hand placement during transfers.   Recommend Subacute rehab to further improve mobility and maximize his functional potential.    DISCHARGE RECOMMENDATIONS  PT Discharge Recommendations: Sub-acute re RLE, shuffling small steps)  Stoop/Curb Assistance: Not tested     Bed mobility  Mod A supine to sit.      Transfers  Mod A x 1 sit to stand from bed and chair      Patient End of Session: Up in chair;Needs met;Call light within reach;RN aware of session/fi

## 2018-06-12 NOTE — PLAN OF CARE
Problem: CARDIOVASCULAR - ADULT  Goal: Maintains optimal cardiac output and hemodynamic stability  INTERVENTIONS:  - Monitor vital signs, rhythm, and trends  - Monitor for bleeding, hypotension and signs of decreased cardiac output  - Evaluate effectivenes oral hygiene regimen  - Implement oral medicated treatments as ordered   Outcome: Progressing      Problem: NEUROLOGICAL - ADULT  Goal: Achieves stable or improved neurological status  INTERVENTIONS  - Assess for and report changes in neurological status indicated by assessment.  - Educate pt/family on patient safety including physical limitations  - Instruct pt to call for assistance with activity based on assessment  - Modify environment to reduce risk of injury  - Provide assistive devices as appropriat

## 2018-06-13 PROCEDURE — 99232 SBSQ HOSP IP/OBS MODERATE 35: CPT | Performed by: INTERNAL MEDICINE

## 2018-06-13 NOTE — PROGRESS NOTES
[facility]    Progress Note    Henok Patel Patient Status:  Inpatient    1955 MRN [id number]   Location [facility] 3W/SW Attending Yoli Epps, *   Hosp Day # 6 PCP Mia Jordan MD       SUBJECTIVE:  No s Intravenous Q1H PRN   atorvastatin (LIPITOR) tab 40 mg 40 mg Oral Nightly   carvedilol (COREG) tab 12.5 mg 12.5 mg Oral BID with meals   famoTIDine (PEPCID) tab 20 mg 20 mg Oral Daily   levETIRAcetam (KEPPRA) tab 500 mg 500 mg Oral BID   Potassium Chloride

## 2018-06-13 NOTE — PLAN OF CARE
Attempted to contact patient's guardian Enoc Cain using both numbers on file. Was told they are not in the office at the moment.  Voicemail left at 9:23 am.

## 2018-06-13 NOTE — PHYSICAL THERAPY NOTE
PHYSICAL THERAPY TREATMENT NOTE - INPATIENT     Room Number: 325/325-A       Problem List  Principal Problem:    Left-sided nontraumatic intracerebral hemorrhage, unspecified cerebral location [facility])  Active Problems:    Right sided weakness    BLAS (acute k consistently, decreased sitting and standing balance, decrease mobility, transfers, decrease safety, fearful of falling, poor ambulation tolerance due to weakness and difficulty advancing R LE, as well as need for assist for hand placement during transfers ABILITY STATUS  Gait Assessment   Gait Assistance:  (Mod Ax2)  Distance (ft): 10 steps . Assistive Device: Rolling walker  Pattern:  (difficulty advancing RLE, shuffling steps)  Stoop/Curb Assistance: Not tested     Bed mobility  Mod A supine to sit.

## 2018-06-13 NOTE — OCCUPATIONAL THERAPY NOTE
OCCUPATIONAL THERAPY TREATMENT NOTE - INPATIENT        Room Number: 325/325-A           Presenting Problem: L sided ICH     Problem List  Principal Problem:    Left-sided nontraumatic intracerebral hemorrhage, unspecified cerebral location [facility])  Active Pr currently need…  -   Putting on and taking off regular lower body clothing?: A Lot  -   Bathing (including washing, rinsing, drying)?: A Lot  -   Toileting, which includes using toilet, bedpan or urinal? : A Lot  -   Putting on and taking off regular upper

## 2018-06-13 NOTE — CM/SW NOTE
Progression of care - Cysto cancelled because guardian has not returned phone calls/unable to obtain consent. Awaiting further MD recommendations. Patient lives at [facility].   Rayo Paredes RN, CTL

## 2018-06-13 NOTE — PROGRESS NOTES
Above patient was scheduled for cystoscopy right retrograde pyelogram possible stent placement however for the last 24 hours we have been trying to contact patient's power of  to get a consent in the left several messages and this person has never

## 2018-06-13 NOTE — PROGRESS NOTES
[facility]    Progress Note    Sydneyedmond Quyen Patient Status:  Inpatient    1955 MRN [id number]   Location [facility] 3W/SW Attending Isela Maradiaga, *   Hosp Day # 6 PCP Yoan Desir MD       Subjective:   [place] Ray[place] htn  bp stillerratic  Up and down. . cpm but watch k, creat on aldactone. May need to add clonidine or minxodil  2. Stroke  Stable still not really verbal  3. ckd 4  Renal fx worse. ?  Aldactone repeat in am  4  R hydro  Surgery cancelled due to lack of cons

## 2018-06-13 NOTE — SLP NOTE
SPEECH DAILY NOTE - INPATIENT    ASSESSMENT & PLAN   ASSESSMENT  Pt seen for swallowing therapy to monitor swallowing tolerance, trial possible diet upgrade, and train swallowing precautions. Collaborated with RN, whom reports pt is tolerating diet well. palpation. No overt clinical signs of aspiration. In Progress   Goal #2 The patient/family/caregiver will demonstrate understanding and implementation of aspiration precautions and swallow strategies independently over 3 session(s).     The pt educated o

## 2018-06-14 PROCEDURE — 99233 SBSQ HOSP IP/OBS HIGH 50: CPT | Performed by: INTERNAL MEDICINE

## 2018-06-14 RX ORDER — SODIUM CHLORIDE 9 MG/ML
INJECTION, SOLUTION INTRAVENOUS CONTINUOUS
Status: DISCONTINUED | OUTPATIENT
Start: 2018-06-14 | End: 2018-06-14

## 2018-06-14 RX ORDER — SODIUM CHLORIDE 450 MG/100ML
INJECTION, SOLUTION INTRAVENOUS CONTINUOUS
Status: DISCONTINUED | OUTPATIENT
Start: 2018-06-14 | End: 2018-06-16

## 2018-06-14 NOTE — PHYSICAL THERAPY NOTE
PHYSICAL THERAPY TREATMENT NOTE - INPATIENT     Room Number: 325/325-A       Problem List  Principal Problem:    Left-sided nontraumatic intracerebral hemorrhage, unspecified cerebral location [facility])  Active Problems:    Right sided weakness    BLAS (acute k chair alarm on, Rn was informed.     Pt is Limited by decreased ability to follow commands consistently, decreased sitting and standing balance, decrease mobility, transfers, decrease safety, fearful of falling, poor ambulation tolerance due to weakness and Climbing 3-5 steps with a railing?: Total     AM-PAC Score:  Raw Score: 12   PT Approx Degree of Impairment Score: 68.66%   Standardized Score (AM-PAC Scale): 35.33   CMS Modifier (G-Code): CL    FUNCTIONAL ABILITY STATUS  Gait Assessment   Gait Assistan

## 2018-06-14 NOTE — OCCUPATIONAL THERAPY NOTE
OCCUPATIONAL THERAPY TREATMENT NOTE - INPATIENT        Room Number: 325/325-A           Presenting Problem: L sided ICH     Problem List  Principal Problem:    Left-sided nontraumatic intracerebral hemorrhage, unspecified cerebral location [facility])  Active Pr which includes using toilet, bedpan or urinal? : A Lot  -   Putting on and taking off regular upper body clothing?: A Lot  -   Taking care of personal grooming such as brushing teeth?: A Lot  -   Eating meals?: A Lot    AM-PAC Score:  Score: 12  Approx Deg

## 2018-06-14 NOTE — PROGRESS NOTES
[facility]    Progress Note    Dyllan Jiménez Patient Status:  Inpatient    1955 MRN [id number]   Location [facility] 3W/SW Attending Gunnar Reynolds, *   Hosp Day # 7 PCP Renetta Mcclain MD       SUBJECTIVE:  No s PRN          Assessment/Plan:         Acute intracranial hemorrhage  CT head: Acute left thalamic intraparenchymal hemorrhage with surrounding vasogenic edema. No mass-effect or shift of midline structure.     Right-sided hemiplegia  –Follow-up with neuros

## 2018-06-14 NOTE — PROGRESS NOTES
[facility]    Progress Note      Subjective:     Patient sitting comfortably - ate almost 80% of his meal    Review of Systems:     Unable to obtain - non verbal     Objective:   Temp:  [97.8 °F (36.6 °C)-100.3 °F (37.9 °C)] 97.8 °F (36.6 WBC  6.4  6.0  10.8   PLT  196  201  187     Recent Labs   Lab  06/11/18   0535  06/13/18   0542  06/14/18   0530   GLU  115*  109*  133*   BUN  43*  58*  61*   CREATSERUM  4.16*  4.72*  5.26*   GFRAA  16*  14*  12*   GFRNAA  14*  12*  11*   CA  9.2  9.3 calculus. Also consider extra-renal pelvis and/or UPJ obstruction  - urology input noted - awaiting consent from guardian. Having difficult time reaching to the guardian   - avoid nephrotoxins     2.  ICH:   - CT scan results noted for left thalamic bleed w

## 2018-06-14 NOTE — SLP NOTE
SPEECH DAILY NOTE - INPATIENT    ASSESSMENT & PLAN   ASSESSMENT  Pt seen for swallowing tx to monitor swallowing tolerance. Collaborated with RN, whom reports pt is \"pocketing\" food on current diet during meals.   Pt seen for swallowing therapy while sit demonstrate understanding and implementation of aspiration precautions and swallow strategies independently over 3 session(s). Pt unable to demonstrate retention of information provided.    In Progress   Goal #3 The patient will tolerate trial upgrade of

## 2018-06-15 ENCOUNTER — APPOINTMENT (OUTPATIENT)
Dept: ULTRASOUND IMAGING | Facility: HOSPITAL | Age: 63
DRG: 064 | End: 2018-06-15
Attending: INTERNAL MEDICINE
Payer: MEDICARE

## 2018-06-15 ENCOUNTER — APPOINTMENT (OUTPATIENT)
Dept: GENERAL RADIOLOGY | Facility: HOSPITAL | Age: 63
DRG: 064 | End: 2018-06-15
Attending: UROLOGY
Payer: MEDICARE

## 2018-06-15 ENCOUNTER — ANESTHESIA (OUTPATIENT)
Dept: SURGERY | Facility: HOSPITAL | Age: 63
DRG: 064 | End: 2018-06-15
Payer: MEDICARE

## 2018-06-15 ENCOUNTER — ANESTHESIA EVENT (OUTPATIENT)
Dept: SURGERY | Facility: HOSPITAL | Age: 63
DRG: 064 | End: 2018-06-15
Payer: MEDICARE

## 2018-06-15 PROCEDURE — BT1D1ZZ FLUOROSCOPY OF RIGHT KIDNEY, URETER AND BLADDER USING LOW OSMOLAR CONTRAST: ICD-10-PCS | Performed by: UROLOGY

## 2018-06-15 PROCEDURE — 0T768DZ DILATION OF RIGHT URETER WITH INTRALUMINAL DEVICE, VIA NATURAL OR ARTIFICIAL OPENING ENDOSCOPIC: ICD-10-PCS | Performed by: UROLOGY

## 2018-06-15 PROCEDURE — 74420 UROGRAPHY RTRGR +-KUB: CPT | Performed by: UROLOGY

## 2018-06-15 PROCEDURE — 76775 US EXAM ABDO BACK WALL LIM: CPT | Performed by: INTERNAL MEDICINE

## 2018-06-15 PROCEDURE — 99232 SBSQ HOSP IP/OBS MODERATE 35: CPT | Performed by: INTERNAL MEDICINE

## 2018-06-15 PROCEDURE — 52332 CYSTOSCOPY AND TREATMENT: CPT | Performed by: UROLOGY

## 2018-06-15 PROCEDURE — 0TJB8ZZ INSPECTION OF BLADDER, VIA NATURAL OR ARTIFICIAL OPENING ENDOSCOPIC: ICD-10-PCS | Performed by: UROLOGY

## 2018-06-15 DEVICE — STENT URET 6F 26CM WO GW INL: Type: IMPLANTABLE DEVICE | Status: FUNCTIONAL

## 2018-06-15 RX ORDER — HYDROCODONE BITARTRATE AND ACETAMINOPHEN 5; 325 MG/1; MG/1
1 TABLET ORAL AS NEEDED
Status: DISCONTINUED | OUTPATIENT
Start: 2018-06-15 | End: 2018-06-15 | Stop reason: HOSPADM

## 2018-06-15 RX ORDER — NALOXONE HYDROCHLORIDE 0.4 MG/ML
80 INJECTION, SOLUTION INTRAMUSCULAR; INTRAVENOUS; SUBCUTANEOUS AS NEEDED
Status: DISCONTINUED | OUTPATIENT
Start: 2018-06-15 | End: 2018-06-15 | Stop reason: HOSPADM

## 2018-06-15 RX ORDER — SODIUM CHLORIDE, SODIUM LACTATE, POTASSIUM CHLORIDE, CALCIUM CHLORIDE 600; 310; 30; 20 MG/100ML; MG/100ML; MG/100ML; MG/100ML
INJECTION, SOLUTION INTRAVENOUS CONTINUOUS PRN
Status: DISCONTINUED | OUTPATIENT
Start: 2018-06-15 | End: 2018-06-15 | Stop reason: SURG

## 2018-06-15 RX ORDER — LIDOCAINE HYDROCHLORIDE 10 MG/ML
INJECTION, SOLUTION EPIDURAL; INFILTRATION; INTRACAUDAL; PERINEURAL AS NEEDED
Status: DISCONTINUED | OUTPATIENT
Start: 2018-06-15 | End: 2018-06-15 | Stop reason: SURG

## 2018-06-15 RX ORDER — ONDANSETRON 2 MG/ML
4 INJECTION INTRAMUSCULAR; INTRAVENOUS ONCE AS NEEDED
Status: COMPLETED | OUTPATIENT
Start: 2018-06-15 | End: 2018-06-15

## 2018-06-15 RX ORDER — HYDROMORPHONE HYDROCHLORIDE 1 MG/ML
0.6 INJECTION, SOLUTION INTRAMUSCULAR; INTRAVENOUS; SUBCUTANEOUS EVERY 5 MIN PRN
Status: DISCONTINUED | OUTPATIENT
Start: 2018-06-15 | End: 2018-06-15 | Stop reason: HOSPADM

## 2018-06-15 RX ORDER — HALOPERIDOL 5 MG/ML
0.25 INJECTION INTRAMUSCULAR ONCE AS NEEDED
Status: DISCONTINUED | OUTPATIENT
Start: 2018-06-15 | End: 2018-06-15 | Stop reason: HOSPADM

## 2018-06-15 RX ORDER — METOPROLOL TARTRATE 5 MG/5ML
2.5 INJECTION INTRAVENOUS ONCE
Status: DISCONTINUED | OUTPATIENT
Start: 2018-06-15 | End: 2018-06-15 | Stop reason: HOSPADM

## 2018-06-15 RX ORDER — HYDROCODONE BITARTRATE AND ACETAMINOPHEN 5; 325 MG/1; MG/1
2 TABLET ORAL AS NEEDED
Status: DISCONTINUED | OUTPATIENT
Start: 2018-06-15 | End: 2018-06-15 | Stop reason: HOSPADM

## 2018-06-15 RX ORDER — CIPROFLOXACIN 500 MG/1
500 TABLET, FILM COATED ORAL
Status: DISCONTINUED | OUTPATIENT
Start: 2018-06-16 | End: 2018-06-15

## 2018-06-15 RX ORDER — CIPROFLOXACIN 500 MG/1
500 TABLET, FILM COATED ORAL EVERY OTHER DAY
Status: DISCONTINUED | OUTPATIENT
Start: 2018-06-15 | End: 2018-06-16

## 2018-06-15 RX ORDER — HYDROMORPHONE HYDROCHLORIDE 1 MG/ML
0.2 INJECTION, SOLUTION INTRAMUSCULAR; INTRAVENOUS; SUBCUTANEOUS EVERY 5 MIN PRN
Status: DISCONTINUED | OUTPATIENT
Start: 2018-06-15 | End: 2018-06-15 | Stop reason: HOSPADM

## 2018-06-15 RX ORDER — HYDROMORPHONE HYDROCHLORIDE 1 MG/ML
0.4 INJECTION, SOLUTION INTRAMUSCULAR; INTRAVENOUS; SUBCUTANEOUS EVERY 5 MIN PRN
Status: DISCONTINUED | OUTPATIENT
Start: 2018-06-15 | End: 2018-06-15 | Stop reason: HOSPADM

## 2018-06-15 RX ORDER — SODIUM CHLORIDE, SODIUM LACTATE, POTASSIUM CHLORIDE, CALCIUM CHLORIDE 600; 310; 30; 20 MG/100ML; MG/100ML; MG/100ML; MG/100ML
INJECTION, SOLUTION INTRAVENOUS CONTINUOUS
Status: DISCONTINUED | OUTPATIENT
Start: 2018-06-15 | End: 2018-06-15 | Stop reason: HOSPADM

## 2018-06-15 RX ORDER — LEVOFLOXACIN 5 MG/ML
500 INJECTION, SOLUTION INTRAVENOUS ONCE
Status: COMPLETED | OUTPATIENT
Start: 2018-06-15 | End: 2018-06-15

## 2018-06-15 RX ADMIN — SODIUM CHLORIDE, SODIUM LACTATE, POTASSIUM CHLORIDE, CALCIUM CHLORIDE: 600; 310; 30; 20 INJECTION, SOLUTION INTRAVENOUS at 11:25:00

## 2018-06-15 RX ADMIN — LIDOCAINE HYDROCHLORIDE 50 MG: 10 INJECTION, SOLUTION EPIDURAL; INFILTRATION; INTRACAUDAL; PERINEURAL at 10:58:00

## 2018-06-15 RX ADMIN — SODIUM CHLORIDE, SODIUM LACTATE, POTASSIUM CHLORIDE, CALCIUM CHLORIDE: 600; 310; 30; 20 INJECTION, SOLUTION INTRAVENOUS at 10:49:00

## 2018-06-15 NOTE — PROGRESS NOTES
RN attempted to call Dr. Malou James. Voice mail left. Awaiting call back. 36- Rn transferred to [facility] who was able to consent for the surgery. Consents are verified with another RN and placed in patient's chart.

## 2018-06-15 NOTE — ANESTHESIA POSTPROCEDURE EVALUATION
Patient: Mario Oconnell    Procedure Summary     Date:  06/15/18 Room / Location:  [facility] OR  / [facility] OR    Anesthesia Start:  2582 Anesthesia Stop:  8025    Procedure:  CYSTOSCOPY RETROGRADE (Right ) Diagnosis:  (right hydronephrosis, right renal insuf

## 2018-06-15 NOTE — SLP NOTE
Attempted to see patient to monitor tolerance of PO diet, however, patient is NPO for a procedure. Will attempt later or tomorrow as schedule allows.   Ronda Jones MA/CCC-SLP  Speech Language Pathologist  Emilie. [phone]

## 2018-06-15 NOTE — OCCUPATIONAL THERAPY NOTE
OCCUPATIONAL THERAPY TREATMENT NOTE - INPATIENT        Room Number: 325/325-A           Presenting Problem: L sided ICH     Problem List  Principal Problem:    Left-sided nontraumatic intracerebral hemorrhage, unspecified cerebral location [facility])  Active Pr ASSESSMENT  Ratin           ACTIVITY TOLERANCE  Room air    ACTIVITIES OF DAILY LIVING ASSESSMENT  AM-PAC ‘6-Clicks’ Inpatient Daily Activity Short Form  How much help from another person does the patient currently need…  -   Putting on and taking off 2, cues to not lean to R      Comment:            Goals  on: 2018  Frequency: 3-5x/week

## 2018-06-15 NOTE — PROGRESS NOTES
[facility] Pharmacy Note:  Renal Adjustment for ciprofloxacin (CIPRO)    Lizzy Benjamin is a 61year old male who has been prescribed ciprofloxacin (CIPRO) 250 mg every 24 hrs. CrCl is CrCl cannot be calculated (Unknown ideal weight. ). so the dose has been adjusted

## 2018-06-15 NOTE — OPERATIVE REPORT
[facility] OPERATING ROOM  Operative Note     Preet Lab Location: OR   [facility] [id number] MRN [id number]   Admission Date 6/7/2018 Operation Date 6/15/2018   Attending Physician Bill Bronson, * Operating Physician Darren Albetrs.  MD Garcia      Preop scan does show new onset right hydronephrosis and with worsening renal function nephrology requested that we do a cystoscopy and attempted right stent. After consent obtained from power of .   Patient brought to [facility] cystoscopy suite p

## 2018-06-15 NOTE — PROGRESS NOTES
[facility]    Progress Note    Marc Gutierrez Patient Status:  Inpatient    1955 MRN [id number]   Location [facility] 3W/SW Attending Rocco Phillip, *   Hosp Day # 8 PCP Kimberly Astudillo MD       Subjective:   Louie Francis retrograde pyelogram as described in detail above. 2. Findings consistent with right UPJ obstruction.  3. Placement of a right ureteral stent     Dictated by (CST): Xi Smith MD on 6/15/2018 at 11:57     Approved by (CST): Xi Smith MD on 6/15

## 2018-06-15 NOTE — PHYSICAL THERAPY NOTE
PHYSICAL THERAPY TREATMENT NOTE - INPATIENT     Room Number: 325/325-A       Problem List  Principal Problem:    Left-sided nontraumatic intracerebral hemorrhage, unspecified cerebral location [facility])  Active Problems:    Right sided weakness    BLAS (acute k RLE, maintain midline and weight shift to Lt side, chair follow needed for safety. Pt seemed fearful of falling and needed reassurance that he is safe with therapist. Pt was assisted back in bed per RN request since pt will be going down for a procedure. (including adjusting bedclothes, sheets and blankets)?: A Little   -   Sitting down on and standing up from a chair with arms (e.g., wheelchair, bedside commode, etc.): A Lot   -   Moving from lying on back to sitting on the side of the bed?: A Lot   How m #5     Goal #5   Current Status     Goal #6     Goal #6  Current Status

## 2018-06-15 NOTE — PROGRESS NOTES
[facility]    Progress Note    Jillian Alas Patient Status:  Inpatient    1955 MRN [id number]   Location [facility] 3W/SW Attending Joe Saini, *   Hosp Day # 8 PCP Mohan Reyes MD       SUBJECTIVE:  Incr Daily   sodium bicarbonate tab 650 mg Oral BID   Normal Saline Flush 0.9 % injection 3 mL 3 mL Intravenous PRN   Normal Saline Flush 0.9 % injection 10 mL 10 mL Intravenous PRN          Assessment/Plan:         Acute intracranial hemorrhage  CT head: Acute

## 2018-06-15 NOTE — ANESTHESIA PREPROCEDURE EVALUATION
Anesthesia PreOp Note    HPI:     Paty Lyons is a 61year old male who presents for preoperative consultation requested by: Pipe Phillips MD    Date of Surgery: 6/7/2018 - 6/15/2018    Procedure(s):  CYSTOSCOPY RETROGRADE  Indication: right hydronephrosi Current Facility-Administered Medications Ordered in Epic:  lactated ringers infusion  Intravenous Continuous Arias Alcaraz MD    HYDROcodone-acetaminophen (NORCO) 5-325 MG per tab 1 tablet 1 tablet Oral PRN Arias Alcaraz MD    Or        Dede Bacon 0.45% NaCl infusion  Intravenous Continuous Liza Zaragoza MD Last Rate: 50 mL/hr at 06/15/18 0843   [facility] Hold] HydrALAZINE HCl (APRESOLINE) tab 100 mg 100 mg Oral TID Liza Zaragoza  mg at 06/15/18 0843   [MAR Hold] NIFEdipine (PROCARDIA-XL) 2 Years of education: N/A  Number of children: N/A     Occupational History  None on file     Social History Main Topics   Smoking status: Unknown If Ever Smoked    Smokeless tobacco: Not on file    Alcohol use Not on file    Drug use: Unknown     Other Top (+) GERD well controlled, liver disease, chronic renal disease CRI,     Endo/Other - negative ROS   Abdominal  - normal exam     Other findings: Very poor dentition        Anesthesia Plan:   ASA:  2  Plan:   General  Airway:  LMA  Post-op Pain Management:

## 2018-06-16 PROCEDURE — 99232 SBSQ HOSP IP/OBS MODERATE 35: CPT | Performed by: UROLOGY

## 2018-06-16 PROCEDURE — 99233 SBSQ HOSP IP/OBS HIGH 50: CPT | Performed by: INTERNAL MEDICINE

## 2018-06-16 RX ORDER — DEXTROSE MONOHYDRATE AND SODIUM CHLORIDE 5; .225 G/100ML; G/100ML
INJECTION, SOLUTION INTRAVENOUS CONTINUOUS
Status: DISCONTINUED | OUTPATIENT
Start: 2018-06-16 | End: 2018-06-18

## 2018-06-16 RX ORDER — HYDRALAZINE HYDROCHLORIDE 20 MG/ML
10 INJECTION INTRAMUSCULAR; INTRAVENOUS EVERY 6 HOURS PRN
Status: DISCONTINUED | OUTPATIENT
Start: 2018-06-16 | End: 2018-06-21

## 2018-06-16 NOTE — PLAN OF CARE
CARDIOVASCULAR - ADULT    • Maintains optimal cardiac output and hemodynamic stability Progressing    • Absence of cardiac arrhythmias or at baseline  Pt on continous cardiac monitoring Progressing        HEMATOLOGIC - ADULT    • Free from bleeding injury

## 2018-06-16 NOTE — PROGRESS NOTES
[facility]  Nephrology Daily Progress Note    Preet Lab  [id number]  61year old      HPI:   Preet Lab is a 61year old male. Mute but awake and comfortable. Will follow simple commands.        ROS:     Constitutional:  Negative for decr appropriate for age    Labs:    Lab Results  Component Value Date   WBC 16.1 06/16/2018   HGB 10.3 06/16/2018   HCT 31.5 06/16/2018    06/16/2018   CREATSERUM 7.02 06/16/2018   BUN 80 06/16/2018    06/16/2018   K 4.9 06/16/2018    06/16/ Oral, BID with meals  •  famoTIDine (PEPCID) tab 20 mg, 20 mg, Oral, Daily  •  levETIRAcetam (KEPPRA) tab 500 mg, 500 mg, Oral, BID  •  sodium bicarbonate tab, 650 mg, Oral, BID  •  Normal Saline Flush 0.9 % injection 3 mL, 3 mL, Intravenous, PRN  •  Yara Marcelino

## 2018-06-16 NOTE — PROGRESS NOTES
[facility] Patient Status:  Inpatient    1955 MRN [id number]   Location [facility] 3W/SW Attending Denny Reese. [address] Day # 9 PCP Vonnie Sutton MD       Bernice Erick is a 61year old male patient.     HPI: Mercedes ER (K-DUR M20) CR tab 40 mEq 40 mEq Oral Daily   sodium bicarbonate tab 650 mg Oral BID   Normal Saline Flush 0.9 % injection 3 mL 3 mL Intravenous PRN   Normal Saline Flush 0.9 % injection 10 mL 10 mL Intravenous PRN       ALLERGIES:    Dilantin [Phenytoi function.   Not to pick on anyone but there was difficulty contacting power of  to get consent but it finally was obtained and yesterday patient had cystoscopy right retrograde placement of right double-J stent findings with mild right UPJ obstructi

## 2018-06-17 ENCOUNTER — APPOINTMENT (OUTPATIENT)
Dept: GENERAL RADIOLOGY | Facility: HOSPITAL | Age: 63
DRG: 064 | End: 2018-06-17
Attending: INTERNAL MEDICINE
Payer: MEDICARE

## 2018-06-17 PROCEDURE — 71045 X-RAY EXAM CHEST 1 VIEW: CPT | Performed by: INTERNAL MEDICINE

## 2018-06-17 PROCEDURE — 99233 SBSQ HOSP IP/OBS HIGH 50: CPT | Performed by: INTERNAL MEDICINE

## 2018-06-17 RX ORDER — POTASSIUM CHLORIDE 14.9 MG/ML
20 INJECTION INTRAVENOUS ONCE
Status: COMPLETED | OUTPATIENT
Start: 2018-06-17 | End: 2018-06-17

## 2018-06-17 RX ORDER — POTASSIUM CHLORIDE 20 MEQ/1
20 TABLET, EXTENDED RELEASE ORAL ONCE
Status: DISCONTINUED | OUTPATIENT
Start: 2018-06-17 | End: 2018-06-17

## 2018-06-17 NOTE — SLP NOTE
ADULT SWALLOWING RE-EVALUATION    ASSESSMENT    ASSESSMENT/OVERALL IMPRESSION:    A reassessment of Pt's swallow function was ordered d/t decline in swallow function and reduced ability to swallow food and pills.       Pt alert and assessed sitting upright and sips; No straw  Medication Administration Recommendations: Crushed in puree  Treatment Plan/Recommendations: Dysphagia therapy; Aspiration precautions  Discharge Recommendations/Plan: Undetermined    HISTORY   MEDICAL HISTORY  Reason for Referral: Stroke Swallow: Impaired  Bolus Retrieval: Intact  Bilabial Seal: Intact  Bolus Formation: Impaired  Bolus Propulsion: Impaired  Mastication:  (not assessed)  Retention: Impaired    Pharyngeal Phase of Swallow: Impaired  Laryngeal Elevation Timing: Appears impair

## 2018-06-17 NOTE — PROGRESS NOTES
[facility]  Nephrology Daily Progress Note    Fran Cooney  [id number]  61year old      HPI:   Fran Cooney is a 61year old male. Mute but awake and seems comfortable. Not eating or drinking well this am as per RN. ROS:     Unable. 06/14/18   0530  06/15/18   0605  06/16/18   0607  06/17/18   0531   GLU  109*  133*  110*  134*  139*   BUN  58*  61*  76*  80*  83*   CREATSERUM  4.72*  5.26*  6.58*  7.02*  6.50*   GFRAA  14*  12*  9*  9*  10*   GFRNAA  12*  11*  8*  8*  8*   CA  9.3  9 (LIPITOR) tab 40 mg, 40 mg, Oral, Nightly  •  carvedilol (COREG) tab 12.5 mg, 12.5 mg, Oral, BID with meals  •  famoTIDine (PEPCID) tab 20 mg, 20 mg, Oral, Daily  •  sodium bicarbonate tab, 650 mg, Oral, BID  •  Normal Saline Flush 0.9 % injection 3 mL, 3

## 2018-06-17 NOTE — PROGRESS NOTES
[facility]    Progress Note    Paty Lyons Patient Status:  Inpatient    1955 MRN [id number]   Location [facility] 3W/SW Attending Gladys Watson, *   Hosp Day # 9 PCP Rajwinder Dennis MD       SUBJECTIVE:  Incr Oral BID with meals   famoTIDine (PEPCID) tab 20 mg 20 mg Oral Daily   levETIRAcetam (KEPPRA) tab 500 mg 500 mg Oral BID   sodium bicarbonate tab 650 mg Oral BID   Normal Saline Flush 0.9 % injection 3 mL 3 mL Intravenous PRN   Normal Saline Flush 0.9 % in

## 2018-06-17 NOTE — PROGRESS NOTES
[facility]    Progress Note    Madelin Infante Patient Status:  Inpatient    1955 MRN [id number]   Location [facility] 3W/SW Attending Iain Gonzalez, *   Hosp Day # 10 PCP Girish Huston MD       SUBJECTIVE:  He (APRESOLINE) injection 10 mg 10 mg Intravenous Q6H PRN   HydrALAZINE HCl (APRESOLINE) tab 100 mg 100 mg Oral TID   NIFEdipine (PROCARDIA-XL) 24 hr tab 60 mg Oral Daily   Labetalol HCl (TRANDATE) injection 10 mg 10 mg Intravenous Q1H PRN   atorvastatin (LIP

## 2018-06-18 PROCEDURE — 99233 SBSQ HOSP IP/OBS HIGH 50: CPT | Performed by: INTERNAL MEDICINE

## 2018-06-18 RX ORDER — LEVETIRACETAM 100 MG/ML
500 SOLUTION ORAL 2 TIMES DAILY
Status: DISCONTINUED | OUTPATIENT
Start: 2018-06-18 | End: 2018-06-21

## 2018-06-18 RX ORDER — POTASSIUM CHLORIDE 1.5 G/1.77G
20 POWDER, FOR SOLUTION ORAL ONCE
Status: DISCONTINUED | OUTPATIENT
Start: 2018-06-18 | End: 2018-06-18

## 2018-06-18 RX ORDER — POTASSIUM CHLORIDE 20 MEQ/1
20 TABLET, EXTENDED RELEASE ORAL ONCE
Status: COMPLETED | OUTPATIENT
Start: 2018-06-18 | End: 2018-06-18

## 2018-06-18 RX ORDER — DEXTROSE AND SODIUM CHLORIDE 5; .45 G/100ML; G/100ML
INJECTION, SOLUTION INTRAVENOUS CONTINUOUS
Status: DISCONTINUED | OUTPATIENT
Start: 2018-06-18 | End: 2018-06-21

## 2018-06-18 NOTE — PLAN OF CARE
Problem: CARDIOVASCULAR - ADULT  Goal: Maintains optimal cardiac output and hemodynamic stability  INTERVENTIONS:  - Monitor vital signs, rhythm, and trends  - Monitor for bleeding, hypotension and signs of decreased cardiac output  - Evaluate effectivenes oral hygiene regimen  - Implement oral medicated treatments as ordered   Outcome: Progressing      Problem: NEUROLOGICAL - ADULT  Goal: Achieves stable or improved neurological status  INTERVENTIONS  - Assess for and report changes in neurological status as indicated by assessment.  - Educate pt/family on patient safety including physical limitations  - Instruct pt to call for assistance with activity based on assessment  - Modify environment to reduce risk of injury  - Provide assistive devices as appropr

## 2018-06-18 NOTE — PROGRESS NOTES
[facility] Pharmacy Note: Route Optimization for Levetiracetam (KEPPRA)    Patient is currently on Levetiracetam (KEPPRA) 500 mg IV every 12 hours.    The patient meets the criteria to convert to the oral equivalent as established by the IV to Oral conversion prot

## 2018-06-18 NOTE — PROGRESS NOTES
[facility]    Progress Note      Subjective:     Patient sitting comfortably - having lunch.  Ate 25% of BF     Review of Systems:     Unable to obtain - non verbal     Objective:   Temp:  [98.6 °F (37 °C)-99.2 °F (37.3 °C)] 98.6 °F (37 °C MCV  94.3  93.3  93.3   WBC  16.1*  9.2  7.4   PLT  205  190  207     Recent Labs   Lab  06/13/18   0542   06/16/18   0607  06/17/18   0531  06/18/18   0538   GLU  109*   < >  134*  139*  117*   BUN  58*   < >  80*  83*  73*   CREATSERUM  4.72*   < >  7. no shift  - repeat CT scan showed - Acute left thalamic interparenchymal hemorrhage measures 2.2 x 1.4 x 1.4 cm, similar to prior exam  - neurosurgery input noted - no surgery   - neurology input noted  - goal to keep BP <140/90 mmhg  - on coreg, [place]

## 2018-06-18 NOTE — PHYSICAL THERAPY NOTE
PHYSICAL THERAPY TREATMENT NOTE - INPATIENT     Room Number: 325/325-A       Problem List  Principal Problem:    Left-sided nontraumatic intracerebral hemorrhage, unspecified cerebral location [facility])  Active Problems:    Right sided weakness    BLAS (acute k maintain midline and weight shift to Lt side, chair follow needed for safety.  Pt seemed fearful of falling and needed reassurance that he is safe with therapist. Pt does better with functional oriented task and required lesser assist when functional orient Little   -   Sitting down on and standing up from a chair with arms (e.g., wheelchair, bedside commode, etc.): A Lot   -   Moving from lying on back to sitting on the side of the bed?: A Lot   How much help from another person does the patient currently ne

## 2018-06-18 NOTE — OCCUPATIONAL THERAPY NOTE
OCCUPATIONAL THERAPY TREATMENT NOTE - INPATIENT        Room Number: 325/325-A           Presenting Problem: L sided ICH     Problem List  Principal Problem:    Left-sided nontraumatic intracerebral hemorrhage, unspecified cerebral location [facility])  Active Pr DAILY LIVING ASSESSMENT  AM-PAC ‘6-Clicks’ Inpatient Daily Activity Short Form  How much help from another person does the patient currently need…  -   Putting on and taking off regular lower body clothing?: A Lot  -   Bathing (including washing, rinsing,

## 2018-06-19 PROCEDURE — 99233 SBSQ HOSP IP/OBS HIGH 50: CPT | Performed by: INTERNAL MEDICINE

## 2018-06-19 RX ORDER — POTASSIUM CHLORIDE 1.5 G/1.77G
40 POWDER, FOR SOLUTION ORAL ONCE
Status: COMPLETED | OUTPATIENT
Start: 2018-06-19 | End: 2018-06-19

## 2018-06-19 RX ORDER — NIFEDIPINE 30 MG/1
30 TABLET, EXTENDED RELEASE ORAL ONCE
Status: COMPLETED | OUTPATIENT
Start: 2018-06-19 | End: 2018-06-19

## 2018-06-19 RX ORDER — NIFEDIPINE 90 MG/1
90 TABLET, EXTENDED RELEASE ORAL DAILY
Status: DISCONTINUED | OUTPATIENT
Start: 2018-06-20 | End: 2018-06-21

## 2018-06-19 NOTE — DIETARY NOTE
ADULT NUTRITION INITIAL ASSESSMENT    Pt is at moderate nutrition risk. Pt does not meet malnutrition criteria.       RECOMMENDATIONS TO MD:  See Nutrition Intervention     NUTRITION DIAGNOSIS/PROBLEM:  Inadequate oral intake related to decreased ability t (139 lb)   06/18/18 0626 62.3 kg (137 lb 6.4 oz)   06/17/18 0559 62.5 kg (137 lb 11.2 oz)   06/16/18 0438 61.9 kg (136 lb 8 oz)   06/15/18 0634 60.8 kg (134 lb)   06/14/18 0631 60.9 kg (134 lb 3.2 oz)   06/13/18 0659 62.3 kg (137 lb 6.4 oz)   06/12/18 0600 Renal, Pureed and Nectar Thick Liquids  Oral Supplements: daily  Adds 425 kcal & 19.1 gm protein  ESTIMATED NUTRITION NEEDS:  Calories: 1890 calories/day (30 calories per kg Current wt)  Protein: 76 grams protein/day (1.2 grams protein per kg Current wt)

## 2018-06-19 NOTE — PHYSICAL THERAPY NOTE
PHYSICAL THERAPY TREATMENT NOTE - INPATIENT     Room Number: 325/325-A       Problem List  Principal Problem:    Left-sided nontraumatic intracerebral hemorrhage, unspecified cerebral location [facility])  Active Problems:    Right sided weakness    BLAS (acute k advance RLE, maintain midline and weight shift to Lt side, chair follow needed for safety.  Pt seemed fearful of falling and needed reassurance that he is safe with therapist. Pt does better with functional oriented task and required lesser assist when func blankets)?: A Little   -   Sitting down on and standing up from a chair with arms (e.g., wheelchair, bedside commode, etc.): A Lot   -   Moving from lying on back to sitting on the side of the bed?: A Lot   How much help from another person does the patien #6  Current Status

## 2018-06-19 NOTE — CM/SW NOTE
6/19CM-This Writer requested Teresa Bonilla to send Patient updates to [address] Case Management to set up the Patient's transfer back when medically stable.      -[facility] [id number]

## 2018-06-19 NOTE — PROGRESS NOTES
[facility]    Progress Note    Giovanni Tunisian Patient Status:  Inpatient    1955 MRN [id number]   Location [facility] 3W/SW Attending Mounika Wolfe, *   Hosp Day # 12 PCP Wallace Donahue MD       SUBJECTIVE:  He HydrALAZINE HCl (APRESOLINE) tab 100 mg 100 mg Oral TID   NIFEdipine (PROCARDIA-XL) 24 hr tab 60 mg Oral Daily   Labetalol HCl (TRANDATE) injection 10 mg 10 mg Intravenous Q1H PRN   atorvastatin (LIPITOR) tab 40 mg 40 mg Oral Nightly   carvedilol (COREG)

## 2018-06-19 NOTE — PROGRESS NOTES
[facility]    Progress Note      Subjective:     Lying comfortably     Review of Systems:     Unable to obtain - non verbal     Objective:   Temp:  [97.7 °F (36.5 °C)-98.6 °F (37 °C)] 97.7 °F (36.5 °C)  Pulse:  [60-67] 64  Resp:  [18-24] PLT  205  190  207     Recent Labs   Lab  06/13/18   0542   06/16/18   0607  06/17/18   0531  06/18/18   0538  06/19/18   0536   GLU  109*   < >  134*  139*  117*  132*   BUN  58*   < >  80*  83*  73*  73*   CREATSERUM  4.72*   < >  7.02*  6.50*  5.72* weight   - avoid nephrotoxins  - will give 40 meq po potassium      2.  ICH:   - CT scan results noted for left thalamic bleed with mass effect but no shift  - repeat CT scan showed - Acute left thalamic interparenchymal hemorrhage measures 2.2 x 1.4 x 1.4

## 2018-06-20 PROCEDURE — 99232 SBSQ HOSP IP/OBS MODERATE 35: CPT | Performed by: UROLOGY

## 2018-06-20 PROCEDURE — 99232 SBSQ HOSP IP/OBS MODERATE 35: CPT | Performed by: INTERNAL MEDICINE

## 2018-06-20 RX ORDER — POTASSIUM CHLORIDE 20 MEQ/1
20 TABLET, EXTENDED RELEASE ORAL ONCE
Status: COMPLETED | OUTPATIENT
Start: 2018-06-20 | End: 2018-06-20

## 2018-06-20 NOTE — PROGRESS NOTES
[facility]    Progress Note    Ary Carmona Patient Status:  Inpatient    1955 MRN [id number]   Location [facility] 3W/SW Attending Arturo Stands, *   Hosp Day # 12 PCP Alonso Delacruz MD       SUBJECTIVE:  No mg 12.5 mg Oral BID with meals   famoTIDine (PEPCID) tab 20 mg 20 mg Oral Daily   sodium bicarbonate tab 650 mg Oral BID   Normal Saline Flush 0.9 % injection 3 mL 3 mL Intravenous PRN   Normal Saline Flush 0.9 % injection 10 mL 10 mL Intravenous PRN

## 2018-06-20 NOTE — PROGRESS NOTES
[facility] Patient Status:  Inpatient    1955 MRN [id number]   Location [facility] 3W/SW Attending Denny Reese. [address] Day # 15 PCP Vonnie Sutton MD       Bernice Moyatz is a 61year old male patient.     HPI: BUN a tab 650 mg Oral BID   Normal Saline Flush 0.9 % injection 3 mL 3 mL Intravenous PRN   Normal Saline Flush 0.9 % injection 10 mL 10 mL Intravenous PRN       ALLERGIES:    Dilantin [Phenytoin]    UNKNOWN    VITALS:  Blood pressure 134/75, pulse 75, temperatu MD  6/20/2018

## 2018-06-21 VITALS
OXYGEN SATURATION: 97 % | RESPIRATION RATE: 20 BRPM | TEMPERATURE: 98 F | WEIGHT: 142.38 LBS | HEIGHT: 68 IN | SYSTOLIC BLOOD PRESSURE: 174 MMHG | HEART RATE: 84 BPM | BODY MASS INDEX: 21.58 KG/M2 | DIASTOLIC BLOOD PRESSURE: 81 MMHG

## 2018-06-21 PROCEDURE — 99233 SBSQ HOSP IP/OBS HIGH 50: CPT | Performed by: INTERNAL MEDICINE

## 2018-06-21 RX ORDER — POTASSIUM CHLORIDE 1.5 G/1.77G
60 POWDER, FOR SOLUTION ORAL ONCE
Status: COMPLETED | OUTPATIENT
Start: 2018-06-21 | End: 2018-06-21

## 2018-06-21 NOTE — OCCUPATIONAL THERAPY NOTE
OCCUPATIONAL THERAPY TREATMENT NOTE - INPATIENT        Room Number: 325/325-A      Presenting Problem: left ICH    Problem List  Principal Problem:    Left-sided nontraumatic intracerebral hemorrhage, unspecified cerebral location [facility])  Active Problems: RESTRICTION  Weight Bearing Restriction: None                PAIN ASSESSMENT  Ratin           ACTIVITY TOLERANCE  Room air    ACTIVITIES OF DAILY LIVING ASSESSMENT  AM-PAC ‘6-Clicks’ Inpatient Daily Activity Short Form  How much help from another perso [address] [address]  [phone]

## 2018-06-21 NOTE — PROGRESS NOTES
[facility]    Progress Note      Subjective:     Lying comfortably     Review of Systems:     Unable to obtain - non verbal     Objective:   Temp:  [98.2 °F (36.8 °C)-98.3 °F (36.8 °C)] 98.3 °F (36.8 °C)  Pulse:  [69-79] 78  Resp:  [18] 1 9.2  7.4   PLT  205  190  207     Recent Labs   Lab  06/16/18   0607  06/17/18   0531  06/18/18   0538  06/19/18   0536  06/20/18   0629  06/21/18   0610   GLU  134*  139*  117*  132*  133*  123*   BUN  80*  83*  73*  73*  71*  67*   CREATSERUM  7.02*  6.5 protein   - UACR 3.9 gm   - strict I/o and daily weight   - avoid nephrotoxins  - replace potassium      2.  ICH:   - CT scan results noted for left thalamic bleed with mass effect but no shift  - repeat CT scan showed - Acute left thalamic interparenchymal

## 2018-06-21 NOTE — CM/SW NOTE
This Writer informed registration that the Medicare Rights that is uploaded for the Patient belongs to another Patient, but the account number is the Patient's. Registration took both Patient's names and account numbers to correct.        6/21CM- The Patien

## 2018-06-21 NOTE — PROGRESS NOTES
[facility]    Progress Note    Krista Patiño Patient Status:  Inpatient    1955 MRN [id number]   Location [facility] 3W/SW Attending Aviva Marti, *   Hosp Day # 15 PCP Radha Thorne MD       SUBJECTIVE:  No 12.5 mg Oral BID with meals   famoTIDine (PEPCID) tab 20 mg 20 mg Oral Daily   sodium bicarbonate tab 650 mg Oral BID   Normal Saline Flush 0.9 % injection 3 mL 3 mL Intravenous PRN   Normal Saline Flush 0.9 % injection 10 mL 10 mL Intravenous PRN

## 2018-06-21 NOTE — PHYSICAL THERAPY NOTE
PHYSICAL THERAPY TREATMENT NOTE - INPATIENT     Room Number: 325/325-A       Problem List  Principal Problem:    Left-sided nontraumatic intracerebral hemorrhage, unspecified cerebral location [facility])  Active Problems:    Right sided weakness    BLAS (acute k lesser assist when functional oriented tasks are provided to the pt during PT session. Pt is improving well in PT and demonstrating good progress in PT. Pt continues to stay below his baseline.  Pt is Limited by decreased ability to follow commands consi need...   -   Moving to and from a bed to a chair (including a wheelchair)?: A Little   -   Need to walk in hospital room?: A Lot   -   Climbing 3-5 steps with a railing?: Total     AM-PAC Score:  Raw Score: 15   PT Approx Degree of Impairment Score: 57.7%

## 2018-06-21 NOTE — PROGRESS NOTES
[place] [facility]    Progress Note    [facility] Patient Status:  Inpatient    1955 MRN [id number]   Location [facility] 3W/SW Attending Don Angel, [facility] Day # 13 PCP Solo Talavera MD       Subjective:   Chelo Best unusual rashes present, no abnormal bruising noted  Back/Spine: no abnormalities noted  Musculoskeletal: full ROM all extremities good strength  no deformities  Extremities: no edema, cyanosis  Neurological:  Grossly normal    Results:     Laboratory Data:

## 2018-06-28 NOTE — CONSULTS
[facility]     Report of Consultation     Date of Admission:  6/7/2018  Date of Consult:  6/7/2018      Reason for Consultation:      BLAS and Uncontrolled HTN      History of Present Illness:      Henok Patel is a 61 yrs male with pmh of CKD (APRESOLINE) tab 25 mg 25 mg Oral TID   levETIRAcetam (KEPPRA) tab 500 mg 500 mg Oral BID   Potassium Chloride ER (K-DUR M20) CR tab 40 mEq 40 mEq Oral Daily   sodium bicarbonate tab 650 mg Oral BID   Normal Saline Flush 0.9 % injection 3 mL 3 mL Intraveno PTT, INR  No results for input(s): BNP in the last 168 hours.     Lab Results  Component Value Date   COLORUR Yellow 06/07/2018   CLARITY Hazy 06/07/2018   SPECGRAVITY 1.010 06/07/2018   GLUUR 50  06/07/2018   BILUR Negative 06/07/2018   KETUR Negative 06/

## 2018-08-05 NOTE — DISCHARGE SUMMARY
[place] [facility]    Discharge Summary    Bernice Suarez Patient Status:  Inpatient    1955 MRN [id number]   Location [facility] 3W/SW Attending No att. providers found   Hosp Day # 14 PCP Vonnie Sutton MD     Date of Admissio Acute kidney injury on CKD stage IV  April creatinine 3.64, EGFR 21  - renal US– right hydronephrosis– valuated by urology and ordered CT scan which shows moderate right hydronephrosis with suspected right ureteropelvic junction obstruction.     - s/p cys Tabs  Commonly known as:  COREG      Take 12.5 mg by mouth 2 (two) times daily with meals. Refills:  0     famoTIDine 20 MG Tabs  Commonly known as:  PEPCID      Take 20 mg by mouth daily.    Refills:  0     levETIRAcetam 500 MG Tabs  Commonly known as:

## 2020-09-24 ENCOUNTER — HOSPITAL ENCOUNTER (EMERGENCY)
Age: 65
Discharge: ANOTHER HOSPITAL | End: 2020-09-25
Attending: EMERGENCY MEDICINE
Payer: OTHER GOVERNMENT

## 2020-09-24 ENCOUNTER — APPOINTMENT (OUTPATIENT)
Dept: CT IMAGING | Age: 65
End: 2020-09-24
Attending: EMERGENCY MEDICINE
Payer: OTHER GOVERNMENT

## 2020-09-24 DIAGNOSIS — I21.3 ST ELEVATION (STEMI) MYOCARDIAL INFARCTION (HCC): ICD-10-CM

## 2020-09-24 DIAGNOSIS — I63.9 ACUTE EMBOLIC STROKE (HCC): Primary | ICD-10-CM

## 2020-09-24 DIAGNOSIS — I21.09 ST ELEVATION MYOCARDIAL INFARCTION (STEMI) OF ANTERIOR WALL (HCC): ICD-10-CM

## 2020-09-24 LAB
ANION GAP SERPL CALC-SCNC: 10 MMOL/L (ref 3–18)
APTT PPP: 28 SEC (ref 23–36.4)
BASOPHILS # BLD: 0 K/UL (ref 0–0.1)
BASOPHILS NFR BLD: 0 % (ref 0–2)
BUN SERPL-MCNC: 43 MG/DL (ref 7–18)
BUN/CREAT SERPL: 28 (ref 12–20)
CALCIUM SERPL-MCNC: 10.5 MG/DL (ref 8.5–10.1)
CHLORIDE SERPL-SCNC: 92 MMOL/L (ref 100–111)
CO2 SERPL-SCNC: 30 MMOL/L (ref 21–32)
CREAT SERPL-MCNC: 1.52 MG/DL (ref 0.6–1.3)
DIFFERENTIAL METHOD BLD: ABNORMAL
EOSINOPHIL # BLD: 0 K/UL (ref 0–0.4)
EOSINOPHIL NFR BLD: 0 % (ref 0–5)
ERYTHROCYTE [DISTWIDTH] IN BLOOD BY AUTOMATED COUNT: 13.6 % (ref 11.6–14.5)
GLUCOSE SERPL-MCNC: 219 MG/DL (ref 74–99)
HCT VFR BLD AUTO: 48.4 % (ref 36–48)
HGB BLD-MCNC: 17.4 G/DL (ref 13–16)
INR PPP: 1.2 (ref 0.8–1.2)
LYMPHOCYTES # BLD: 1.8 K/UL (ref 0.9–3.6)
LYMPHOCYTES NFR BLD: 13 % (ref 21–52)
MCH RBC QN AUTO: 30.6 PG (ref 24–34)
MCHC RBC AUTO-ENTMCNC: 36 G/DL (ref 31–37)
MCV RBC AUTO: 85.2 FL (ref 74–97)
MONOCYTES # BLD: 0.8 K/UL (ref 0.05–1.2)
MONOCYTES NFR BLD: 6 % (ref 3–10)
NEUTS SEG # BLD: 11.5 K/UL (ref 1.8–8)
NEUTS SEG NFR BLD: 81 % (ref 40–73)
PLATELET # BLD AUTO: 359 K/UL (ref 135–420)
PMV BLD AUTO: 10.6 FL (ref 9.2–11.8)
POTASSIUM SERPL-SCNC: 3.2 MMOL/L (ref 3.5–5.5)
PROTHROMBIN TIME: 15.1 SEC (ref 11.5–15.2)
RBC # BLD AUTO: 5.68 M/UL (ref 4.7–5.5)
SODIUM SERPL-SCNC: 132 MMOL/L (ref 136–145)
TROPONIN I SERPL-MCNC: 31.1 NG/ML (ref 0–0.04)
WBC # BLD AUTO: 14.1 K/UL (ref 4.6–13.2)

## 2020-09-24 PROCEDURE — 70450 CT HEAD/BRAIN W/O DYE: CPT

## 2020-09-24 PROCEDURE — 99285 EMERGENCY DEPT VISIT HI MDM: CPT

## 2020-09-24 PROCEDURE — 80048 BASIC METABOLIC PNL TOTAL CA: CPT

## 2020-09-24 PROCEDURE — 93005 ELECTROCARDIOGRAM TRACING: CPT

## 2020-09-24 PROCEDURE — 99255 IP/OBS CONSLTJ NEW/EST HI 80: CPT | Performed by: INTERNAL MEDICINE

## 2020-09-24 PROCEDURE — 85025 COMPLETE CBC W/AUTO DIFF WBC: CPT

## 2020-09-24 PROCEDURE — 74011250637 HC RX REV CODE- 250/637: Performed by: EMERGENCY MEDICINE

## 2020-09-24 PROCEDURE — 74011000636 HC RX REV CODE- 636: Performed by: EMERGENCY MEDICINE

## 2020-09-24 PROCEDURE — 85610 PROTHROMBIN TIME: CPT

## 2020-09-24 PROCEDURE — 71275 CT ANGIOGRAPHY CHEST: CPT

## 2020-09-24 PROCEDURE — 85730 THROMBOPLASTIN TIME PARTIAL: CPT

## 2020-09-24 PROCEDURE — 96366 THER/PROPH/DIAG IV INF ADDON: CPT

## 2020-09-24 PROCEDURE — 84484 ASSAY OF TROPONIN QUANT: CPT

## 2020-09-24 PROCEDURE — 96365 THER/PROPH/DIAG IV INF INIT: CPT

## 2020-09-24 RX ORDER — ATORVASTATIN CALCIUM 40 MG/1
80 TABLET, FILM COATED ORAL
Status: DISCONTINUED | OUTPATIENT
Start: 2020-09-24 | End: 2020-09-25 | Stop reason: HOSPADM

## 2020-09-24 RX ORDER — ASPIRIN 300 MG/1
150 SUPPOSITORY RECTAL DAILY
Status: DISCONTINUED | OUTPATIENT
Start: 2020-09-25 | End: 2020-09-25 | Stop reason: HOSPADM

## 2020-09-24 RX ORDER — SODIUM CHLORIDE 9 MG/ML
50 INJECTION, SOLUTION INTRAVENOUS CONTINUOUS
Status: DISCONTINUED | OUTPATIENT
Start: 2020-09-24 | End: 2020-09-25 | Stop reason: HOSPADM

## 2020-09-24 RX ORDER — ASPIRIN 600 MG/1
300 SUPPOSITORY RECTAL ONCE
Status: COMPLETED | OUTPATIENT
Start: 2020-09-24 | End: 2020-09-24

## 2020-09-24 RX ADMIN — ASPIRIN 300 MG: 600 SUPPOSITORY RECTAL at 22:30

## 2020-09-24 RX ADMIN — IOPAMIDOL 100 ML: 755 INJECTION, SOLUTION INTRAVENOUS at 22:17

## 2020-09-25 VITALS
HEIGHT: 70 IN | RESPIRATION RATE: 22 BRPM | DIASTOLIC BLOOD PRESSURE: 79 MMHG | OXYGEN SATURATION: 99 % | BODY MASS INDEX: 27.2 KG/M2 | HEART RATE: 93 BPM | WEIGHT: 190 LBS | TEMPERATURE: 97.8 F | SYSTOLIC BLOOD PRESSURE: 122 MMHG

## 2020-09-25 PROBLEM — I63.9 CVA (CEREBRAL VASCULAR ACCIDENT) (HCC): Status: ACTIVE | Noted: 2020-09-25

## 2020-09-25 PROBLEM — I21.3 STEMI (ST ELEVATION MYOCARDIAL INFARCTION) (HCC): Status: ACTIVE | Noted: 2020-09-25

## 2020-09-25 PROBLEM — I10 HYPERTENSION: Status: ACTIVE | Noted: 2020-09-25

## 2020-09-25 PROBLEM — I63.22: Status: ACTIVE | Noted: 2020-09-25

## 2020-09-25 PROBLEM — G93.6 CEREBRAL EDEMA (HCC): Status: ACTIVE | Noted: 2020-09-25

## 2020-09-25 LAB
ATRIAL RATE: 92 BPM
CALCULATED P AXIS, ECG09: 62 DEGREES
CALCULATED R AXIS, ECG10: -5 DEGREES
CALCULATED T AXIS, ECG11: 137 DEGREES
DIAGNOSIS, 93000: NORMAL
P-R INTERVAL, ECG05: 148 MS
Q-T INTERVAL, ECG07: 382 MS
QRS DURATION, ECG06: 100 MS
QTC CALCULATION (BEZET), ECG08: 472 MS
TROPONIN I SERPL-MCNC: 30.7 NG/ML (ref 0–0.04)
VENTRICULAR RATE, ECG03: 92 BPM

## 2020-09-25 PROCEDURE — 74011250636 HC RX REV CODE- 250/636: Performed by: EMERGENCY MEDICINE

## 2020-09-25 PROCEDURE — 93005 ELECTROCARDIOGRAM TRACING: CPT

## 2020-09-25 PROCEDURE — 84484 ASSAY OF TROPONIN QUANT: CPT

## 2020-09-25 PROCEDURE — 74011250636 HC RX REV CODE- 250/636: Performed by: INTERNAL MEDICINE

## 2020-09-25 RX ORDER — POTASSIUM CHLORIDE 7.45 MG/ML
10 INJECTION INTRAVENOUS
Status: DISPENSED | OUTPATIENT
Start: 2020-09-25 | End: 2020-09-25

## 2020-09-25 RX ADMIN — POTASSIUM CHLORIDE 10 MEQ: 10 INJECTION, SOLUTION INTRAVENOUS at 02:46

## 2020-09-25 RX ADMIN — HEPARIN SODIUM 8 UNITS/KG/HR: 10000 INJECTION, SOLUTION INTRAVENOUS at 03:07

## 2020-09-25 RX ADMIN — POTASSIUM CHLORIDE 10 MEQ: 10 INJECTION, SOLUTION INTRAVENOUS at 03:28

## 2020-09-25 RX ADMIN — POTASSIUM CHLORIDE 10 MEQ: 10 INJECTION, SOLUTION INTRAVENOUS at 04:37

## 2020-09-25 RX ADMIN — SODIUM CHLORIDE 50 ML/HR: 900 INJECTION, SOLUTION INTRAVENOUS at 02:46

## 2020-09-25 NOTE — ED TRIAGE NOTES
Patient presents to the ED with complaints of right sided weakness. Per EMS last known normal was 9/24/2020. Upon arrival patient has right sided facial drooping with slurred speech. Patient is able to follow commands and alert and oriented x 4.

## 2020-09-25 NOTE — PROGRESS NOTES
responded to Code STEMI for  Tres Hays, who is a 72 y. o.,male,     Upon arrival patient was resting in his bed. I expressed silent prayers for strength. I also checked with registration/security to see if family members were present. This code was later cancelled @11pm    The  provided the following Interventions:  Provided crisis pastoral care and pastoral support. Offered prayers on behalf for the patient. The following outcomes were achieved:      Assessment:  There are no spiritual or Amish issues which require intervention at this time. Plan:  Chaplains will continue to follow and will provide pastoral care on an as needed/requested basis.         [address] [place] [address]   [phone]

## 2020-09-25 NOTE — ED PROVIDER NOTES
EMERGENCY DEPARTMENT HISTORY AND PHYSICAL EXAM      Date: 9/24/2020  Patient Name: Immanuel Dutton    History of Presenting Illness     Chief Complaint   Patient presents with    Extremity Weakness       History (Context): Immanuel Dutton is a 72 y.o. gentleman with hypertension and hyperlipidemia who presents with acute onset, persistent, severe dysarthria, right facial droop, intermittent right upper extremity weakness. The patient denies any exacerbating/relieving features or other associated symptoms. Last known normal: 26 hours ago    Patient not on blood thinners. On review of systems, the patient denies chest pain, shortness of breath, fever, chills, rashes, vision changes, falls, head trauma, headache, other numbness/weakness/tingling. PCP: Other, MD Meredith        Past History     Past Medical History:  No past medical history on file. Past Surgical History:  No past surgical history on file. Family History:  No family history on file. Social History:  Social History     Tobacco Use    Smoking status: Not on file   Substance Use Topics    Alcohol use: Not on file    Drug use: Not on file       Allergies:  No Known Allergies    PMH, PSH, family history, social history, allergies reviewed with the patient with significant items noted above. Review of Systems   As per HPI, otherwise reviewed and negative. Physical Exam     Vitals:    09/25/20 0400 09/25/20 0415 09/25/20 0430 09/25/20 0445   BP: 111/64 119/71 118/66 122/79   Pulse: 89 89 89 93   Resp: 19 20 (!) 31 22   SpO2: 98% 99% 97% 99%   Weight:       Height:           Gen: No acute distress  HEENT: Normocephalic, sclera anicteric  Cardiovascular: Normal rate, regular rhythm, no murmurs, rubs, gallops. Pulses intact and equal distally. Pulmonary: No respiratory distress. No stridor. Clear lungs. ABD: Soft, nontender, nondistended.   Neuro: Prominent right-sided facial droop, mild drift on the right arm, dysarthria, dysdiadochokinesis, dysmetria with finger-nose-finger, ptosis on the right, and otherwise normal neurologic exam.  NIH stroke scale 9    Psych: Normal thought content and thought processes. : No CVA tenderness  EXT: Moves all extremities well. No cyanosis or clubbing. Skin: Warm and well-perfused. Other:        Diagnostic Study Results     Labs -     Recent Results (from the past 12 hour(s))   CBC WITH AUTOMATED DIFF    Collection Time: 09/24/20  9:31 PM   Result Value Ref Range    WBC 14.1 (H) 4.6 - 13.2 K/uL    RBC 5.68 (H) 4.70 - 5.50 M/uL    HGB 17.4 (H) 13.0 - 16.0 g/dL    HCT 48.4 (H) 36.0 - 48.0 %    MCV 85.2 74.0 - 97.0 FL    MCH 30.6 24.0 - 34.0 PG    MCHC 36.0 31.0 - 37.0 g/dL    RDW 13.6 11.6 - 14.5 %    PLATELET 158 879 - 938 K/uL    MPV 10.6 9.2 - 11.8 FL    NEUTROPHILS 81 (H) 40 - 73 %    LYMPHOCYTES 13 (L) 21 - 52 %    MONOCYTES 6 3 - 10 %    EOSINOPHILS 0 0 - 5 %    BASOPHILS 0 0 - 2 %    ABS. NEUTROPHILS 11.5 (H) 1.8 - 8.0 K/UL    ABS. LYMPHOCYTES 1.8 0.9 - 3.6 K/UL    ABS. MONOCYTES 0.8 0.05 - 1.2 K/UL    ABS. EOSINOPHILS 0.0 0.0 - 0.4 K/UL    ABS.  BASOPHILS 0.0 0.0 - 0.1 K/UL    DF AUTOMATED     METABOLIC PANEL, BASIC    Collection Time: 09/24/20  9:31 PM   Result Value Ref Range    Sodium 132 (L) 136 - 145 mmol/L    Potassium 3.2 (L) 3.5 - 5.5 mmol/L    Chloride 92 (L) 100 - 111 mmol/L    CO2 30 21 - 32 mmol/L    Anion gap 10 3.0 - 18 mmol/L    Glucose 219 (H) 74 - 99 mg/dL    BUN 43 (H) 7.0 - 18 MG/DL    Creatinine 1.52 (H) 0.6 - 1.3 MG/DL    BUN/Creatinine ratio 28 (H) 12 - 20      GFR est AA 56 (L) >60 ml/min/1.73m2    GFR est non-AA 46 (L) >60 ml/min/1.73m2    Calcium 10.5 (H) 8.5 - 10.1 MG/DL   TROPONIN I    Collection Time: 09/24/20  9:31 PM   Result Value Ref Range    Troponin-I, QT 31.10 (HH) 0.0 - 0.045 NG/ML   PROTHROMBIN TIME + INR    Collection Time: 09/24/20  9:31 PM   Result Value Ref Range    Prothrombin time 15.1 11.5 - 15.2 sec    INR 1.2 0.8 - 1.2     PTT    Collection Time: 09/24/20  9:31 PM   Result Value Ref Range    aPTT 28.0 23.0 - 36.4 SEC   EKG, 12 LEAD, INITIAL    Collection Time: 09/24/20  9:44 PM   Result Value Ref Range    Ventricular Rate 92 BPM    Atrial Rate 92 BPM    P-R Interval 148 ms    QRS Duration 100 ms    Q-T Interval 382 ms    QTC Calculation (Bezet) 472 ms    Calculated P Axis 62 degrees    Calculated R Axis -5 degrees    Calculated T Axis 137 degrees    Diagnosis       Normal sinus rhythm  Possible Left atrial enlargement  Anterior infarct , possibly acute  T wave abnormality, consider lateral ischemia  ** ** ACUTE MI / STEMI ** **  Abnormal ECG  No previous ECGs available     EKG, 12 LEAD, INITIAL    Collection Time: 09/25/20  4:54 AM   Result Value Ref Range    Ventricular Rate 87 BPM    Atrial Rate 87 BPM    P-R Interval 150 ms    QRS Duration 104 ms    Q-T Interval 388 ms    QTC Calculation (Bezet) 466 ms    Calculated P Axis 61 degrees    Calculated R Axis -14 degrees    Calculated T Axis 149 degrees    Diagnosis       Normal sinus rhythm  Possible Left atrial enlargement  Anterior infarct (cited on or before 24-SEP-2020)  T wave abnormality, consider inferolateral ischemia  ** ** ACUTE MI / STEMI ** **  Abnormal ECG  When compared with ECG of 24-SEP-2020 21:44,  No significant change was found         Radiologic Studies -   CT HEAD WO CONT   Final Result   IMPRESSION:       1. Sites of acute or subacute infarct in bilateral superior cerebellum, right   occipital/posterior temporal lobe and likely central denia/midbrain. Consider   posterior circulation and embolic causes. 2. Additional infarcts at the right lateral frontal/parietal lobe favor acute or   subacute-chronic etiology. Other chronic changes in the right cerebrum. 3. No acute hemorrhage. Critical findings called to Dr. Lynne Ho 9/24/2020 at [phone].       CTA CHEST ABD PELV W WO CONT    (Results Pending)     CT Results  (Last 48 hours)               09/24/20 6219  CT HEAD WO CONT Final result    Impression:  IMPRESSION:        1. Sites of acute or subacute infarct in bilateral superior cerebellum, right   occipital/posterior temporal lobe and likely central denia/midbrain. Consider   posterior circulation and embolic causes. 2. Additional infarcts at the right lateral frontal/parietal lobe favor acute or   subacute-chronic etiology. Other chronic changes in the right cerebrum. 3. No acute hemorrhage. Critical findings called to Dr. Maisha Knutson 9/24/2020 at [phone]. Narrative:  CT OF THE HEAD WITHOUT CONTRAST. CLINICAL HISTORY: Stroke. TECHNIQUE: Helical scan obtained of the head were obtained from the skull vertex   through the base of the skull without intravenous contrast.    All CT scans are   performed using dose optimization techniques as appropriate to the performed   exam including the following: Automated exposure control, adjustment of mA   and/or kV according to patient size, and use of iterative reconstructive   technique. COMPARISON: None. FINDINGS:        Cortical atrophy, right greater than left cerebrum. Mild compensatory   enlargement of the right lateral ventricle in response to atrophy. No   significant hydrocephalus. Hypodense parenchyma along the left greater than   right upper cerebellum, as well as adjacent right occipital-posterior temporal   lobe. Evidence of chronic or subacute-chronic infarcts at the right lateral   cerebrum with developing encephalomalacia. Chronic right external capsule region   infarct. Age indeterminate central denia/midbrain lacunar infarct. No   intracranial hemorrhage. No herniation. The visualized paranasal sinuses are   clear. The mastoid air cells are clear. The visualized bony structures are   unremarkable. CXR Results  (Last 48 hours)    None        CTA CAP:    Preliminary reading: No aortic aneurysm or dissection. Arterial wall calcifications noted.  High-grade stenosis in left superficial femoral artery. No large central pulmonary embolus, limited by motion artifact. Small hiatal hernia. Esophagitis is difficult to exclude. No other acute findings in the lungs, chest, abdomen or pelvis. Diverticulosis. Normal appendix. Left renal cyst.    Medical Decision Making   I am the first provider for this patient. I reviewed the vital signs, available nursing notes, past medical history, past surgical history, family history and social history. Vital Signs-Reviewed the patient's vital signs. EKG: Interpreted by myself. Anterior STEMI     Records Reviewed: Personally, on initial evaluation    MDM:   Patient presents with stroke-like symptoms. Exam significant for NIH stroke scale of 9. FELICIANO positive. DDX considered: Ischemic stroke, intracranial hemorrhage, TIA, complex migraine, malingering, atypical seizure, aortic dissection. Plan:   Consult neurology  Close and active management  As patient is not a TPA candidate and the patient not a thrombectomy candidate, the patient not a code stroke. Will work-up the same though. Orders as below:  Orders Placed This Encounter    CT HEAD WO CONT    CTA CHEST ABD PELV W WO CONT    CBC w/ Auto Diff    Basic Metabolic Panel (BMP)    Troponin I    Lab PT/INR    PTT - BASELINE PRIOR TO INITIATION OF HEPARIN INFUSION    PTT    PTT    CBC W/ AUTOMATED DIFF--DAILY WHILE ON HEPARIN    TROPONIN I    DIET NPO    NURSING-MISCELLANEOUS: Activate CODE STROKE ONE TIME    Perform NIH Stroke Scale    Nursing Dysphagia Screen (STAND)    POC GLUCOSE    NOTIFY PROVIDER: LAB VALUES CHANGES    NOTIFY PROVIDER: SPECIFY If any sign of bleeding and/or hematoma, STOP heparin. Notify physician STAT and do STAT CBC. Hold heparin until notified by physician.   ONE TIME Routine    NOTIFY PROVIDER: [address] Neurology    POC PT/INR    EKG, 12 LEAD, INITIAL    Initial ECG    EKG, 12 LEAD, INITIAL    TYPE & SCREEN    iopamidoL (ISOVUE-370) 76 % injection  mL    aspirin (ASA) suppository 300 mg    aspirin (ASA) suppository 150 mg    atorvastatin (LIPITOR) tablet 80 mg    0.9% sodium chloride infusion    . Please update allergies    potassium chloride 10 mEq in 100 ml IVPB    . Enter/update pt's height, allergy information    heparin (porcine) 25,000 units in 0.45% saline 250 ml 25,000 Units infusion    Consult Tele-Neurology [facility])        ED Course:   ED Course as of Sep 25 0521   Thu Sep 24, 2020   2149 ECG demonstrates what looks to be an anterior STEMI however, the patient is chest pain-free. Given the patient has an EKG that meets the diagnostic criteria for STEMI, and given the patient is having strokelike symptoms, aortic dissection is very possible. Will be performing an aortic dissection protocol without labs. Initial ECG [DT]   2300 We canceled code STEMI, after speaking with tele-neurology and speaking with cardiology. It is clear that the patient had a STEMI, but his acute infarcts, especially in the cerebellum preclude large boluses of heparin. Thus, we canceled the code STEMI. Tele-neurology recommended transfer to a place with neurosurgery, as he is likely to occlude his fourth ventricle and get acute hydrocephalus from the size of his infarct. I have informed cardiology    [DT]   Fri Sep 25, 2020   0005 Spoke to [facility] neurosurgery. They stated the patient does not need to be transferred at this time. I will admit the patient. [DT]   0430 Patient was accepted by [facility] after speaking multiple times to the emergency physician and after speaking to neurosurgery. I have performed a formal handoff. [DT]   1042 The radiologist, the patient remained chest pain-free on multiple reassessments, and the patient's neurologic exam remained baseline. The patient had a repeat EKG demonstrated unchanged anterior STEMI. Patient troponin was repeated.   The patient was hypokalemic and we repleted the potassium. Ultimately, the patient was placed on heparin with a goal PTT of 50 to 70 seconds. To start, we dosed at 12 units/kg/h of heparin on a constant rate. Initial PTT prior to heparin infusion was 28. We did not decide to give a beta-blocker, as we felt the stroke impact would be worse than the effect on the myocardium. [DT]      ED Course User Index  [DT] Rj Mendoza MD       Critical Care Time:  The services I provided to this patient were to treat and/or prevent clinically significant deterioration that could result in the failure of one or more body systems and/or organ systems due to acute stroke, STEMI. This is further complicated by the location and size of the multiple infarcts, which put the patient at risk for acute hydrocephalus and necessitated transfer. Services included the following:  -reviewing nursing notes and old charts  -vital sign assessments  -direct patient care  -medication orders and management  -interpreting and reviewing diagnostic studies/labs  -re-evaluations  -documentation time  -Discussions with consultants and the transfer center    Aggregate critical care time was 155 minutes, which includes only time during which I was engaged in work directly related to the patient's care as described above, whether I was at bedside or elsewhere in the Emergency Department. It did not include time spent performing other reported procedures or the services of residents, students, nurses, or advance practice providers. Rj Mendoza MD    5:22 AM        Diagnosis     Clinical Impression:   1. Acute embolic stroke ([facility]    2. ST elevation (STEMI) myocardial infarction ([facility]    3. ST elevation myocardial infarction (STEMI) of anterior wall (HCC)        Signed,  Shilo Kaba MD  Emergency Physician  [facility]    As a voice dictation software was utilized to dictate this note, minor word transpositions can occur.   I apologize for confusing wording and typographic errors. Please feel free to contact me for clarification.

## 2020-09-25 NOTE — CONSULTS
[facility]      CARDIOLOGY CONSULT NOTE    Date of  Admission: 9/24/2020  9:36 PM   Primary Care Physician:  Meredith Jacques MD  Consult requested by : Dr. Fredrick Drew, ER  Reason for consult: Acute anterior STEMI     Plan:     1. Acute anterior STEMI: Patient not having any symptoms of chest pain or shortness of breath and has acute stroke as well. STEMI code was called initially but later canceled after discussing with neurology as patient is not a candidate for IV anticoagulation due to very high risk of hemorrhagic conversion of this large stroke. Medical treatment will be given. Aspirin has already been given. Unfortunately, and coagulation with heparin or any beta-blockers are not possible due to acute stroke. Fortunately, he is not having any chest pain. He is hemodynamically stable and the duration of ST elevation is not very clear because of lack of symptoms. 2. Acute stroke: As per CAT scan, patient has acute strokes in bilateral superior cerebellum and occipital/posterior temporal lobe and also possibly central denia/midbrain. Additionally subacute infarcts are seen in right lateral frontal parietal lobes. 3. History of hypertension: We will hold medications due to acute stroke for permissive hypertension for now. 4. History of hyperlipidemia: As per [place] list, no statins noted in the medication list.  It should be given as soon as possible. Unfortunately patient is not a candidate for IV anticoagulation or IV thrombolysis as he is past his window acute stroke. Due to the large nature of stroke and edema, he is being planned to be transferred to [facility].  Will add Lipitor 80 mg but not sure if patient can swallow due to acute stroke. Check a full echo later in the morning whether he is in this hospital or in [facility].  Prognosis is guarded at best and likely poor.   We will follow him in the morning if patient is still in this hospital. Assessment:              History of Present Illness: This is a 72 y.o. male admitted for No admission diagnoses are documented for this encounter. .  42-year-old -American male who gets his medical care at [facility] brought here for dysarthria and noted to have acute stroke. EKG also noted to have acute anterior STEMI and subsequently troponin resulting at 31.1. Patient is not having any chest pain or shortness of breath. It seems patient can understand me well but he is having difficulty finding his words and his speech is slurred. STEMI code that was initially called by the ER physician was later canceled after discussing with telemetry neurology and myself as he was not a good candidate for anticoagulation. There are no old records available in from this hospital fortunately records can be seen from [facility] where I can see the diagnosis of hypertension, hyperlipidemia, erectile dysfunction and cigarette smoking and his list of medications which involve amlodipine, chlorthalidone, aspirin, multivitamin and omeprazole. Past Medical History:   No past medical history on file. No past surgical history on file. Social History:     Social History     Socioeconomic History    Marital status: SINGLE     Spouse name: Not on file    Number of children: Not on file    Years of education: Not on file    Highest education level: Not on file        Family History:   No family history on file. Medications:   Not on File     No current facility-administered medications for this encounter. No current outpatient medications on file. Review Of Systems:     Patient denied any fever cough cold vomiting diarrhea hematuria dysuria chronic liver lung or kidney disease. No significant skin rashes or any severe headaches noted.      Physical Exam:     Visit Vitals  /71   Pulse 92   Resp 19   Wt 86.2 kg (190 lb)   SpO2 99%     BP Readings from Last 3 Encounters:   09/24/20 126/71 Pulse Readings from Last 3 Encounters:   09/24/20 92     Wt Readings from Last 3 Encounters:   09/24/20 86.2 kg (190 lb)         No intake or output data in the 24 hours ending 09/24/20 7481      TELE: Sinus rhythm    General: alert, in no apparent distress and dysarthric  HENT: Normocephalic, atraumatic. Normal external eye. Neck :  no bruit, no JVD  Cardiac:  regular rate and rhythm, S1, S2 normal, no murmur, click, rub or gallop  Chest/Lungs:chest clear, no wheezing, rales, normal symmetric air entry  Abdomen: Soft, nontender, no masses  Extremities:  No c/c/e, peripheral pulses present  Neurological: Not done in detail by me but patient has dysarthria and slurred speech  Psychiatric : The patient is awake, alert and oriented x3. Annette Barone is interactive and appropriate. Data Review:     Basic Metabolic Profile   @[facility](OT:0,CZJYNTFNQ:6,LGDISUHK:4,XF1:9,KGH:6,NEOTM:4,CQZVHRH:7,MQHDVUV:1,AHFKDWXMK:1,WSMTAMNLJL:8)@     CBC w/Diff    @[facility](WBC:3,HEMOGLOBIN:3,HCT:3,PLATELET:3)@ @[facility](JCPHB:2,VUKT:2,QNLANWZAEJP:5)@     Cardiac Enzymes   @LABRCNTR(cpk:3,ckmb:3,ckmbindx:3,tropquant:3)@     Coagulation   @LABRCNTR(pt:3,inr:3,aptt:3)@     Recent Results (from the past 48 hour(s))   CBC WITH AUTOMATED DIFF    Collection Time: 09/24/20  9:31 PM   Result Value Ref Range    WBC 14.1 (H) 4.6 - 13.2 K/uL    RBC 5.68 (H) 4.70 - 5.50 M/uL    HGB 17.4 (H) 13.0 - 16.0 g/dL    HCT 48.4 (H) 36.0 - 48.0 %    MCV 85.2 74.0 - 97.0 FL    MCH 30.6 24.0 - 34.0 PG    MCHC 36.0 31.0 - 37.0 g/dL    RDW 13.6 11.6 - 14.5 %    PLATELET 028 095 - 815 K/uL    MPV 10.6 9.2 - 11.8 FL    NEUTROPHILS 81 (H) 40 - 73 %    LYMPHOCYTES 13 (L) 21 - 52 %    MONOCYTES 6 3 - 10 %    EOSINOPHILS 0 0 - 5 %    BASOPHILS 0 0 - 2 %    ABS. NEUTROPHILS 11.5 (H) 1.8 - 8.0 K/UL    ABS. LYMPHOCYTES 1.8 0.9 - 3.6 K/UL    ABS. MONOCYTES 0.8 0.05 - 1.2 K/UL    ABS. EOSINOPHILS 0.0 0.0 - 0.4 K/UL    ABS.  BASOPHILS 0.0 0.0 - 0.1 K/UL    DF AUTOMATED METABOLIC PANEL, BASIC    Collection Time: 09/24/20  9:31 PM   Result Value Ref Range    Sodium 132 (L) 136 - 145 mmol/L    Potassium 3.2 (L) 3.5 - 5.5 mmol/L    Chloride 92 (L) 100 - 111 mmol/L    CO2 30 21 - 32 mmol/L    Anion gap 10 3.0 - 18 mmol/L    Glucose 219 (H) 74 - 99 mg/dL    BUN 43 (H) 7.0 - 18 MG/DL    Creatinine 1.52 (H) 0.6 - 1.3 MG/DL    BUN/Creatinine ratio 28 (H) 12 - 20      GFR est AA 56 (L) >60 ml/min/1.73m2    GFR est non-AA 46 (L) >60 ml/min/1.73m2    Calcium 10.5 (H) 8.5 - 10.1 MG/DL   TROPONIN I    Collection Time: 09/24/20  9:31 PM   Result Value Ref Range    Troponin-I, QT 31.10 (HH) 0.0 - 0.045 NG/ML   PROTHROMBIN TIME + INR    Collection Time: 09/24/20  9:31 PM   Result Value Ref Range    Prothrombin time 15.1 11.5 - 15.2 sec    INR 1.2 0.8 - 1.2     PTT    Collection Time: 09/24/20  9:31 PM   Result Value Ref Range    aPTT 28.0 23.0 - 36.4 SEC   EKG, 12 LEAD, INITIAL    Collection Time: 09/24/20  9:44 PM   Result Value Ref Range    Ventricular Rate 92 BPM    Atrial Rate 92 BPM    P-R Interval 148 ms    QRS Duration 100 ms    Q-T Interval 382 ms    QTC Calculation (Bezet) 472 ms    Calculated P Axis 62 degrees    Calculated R Axis -5 degrees    Calculated T Axis 137 degrees    Diagnosis       Normal sinus rhythm  Possible Left atrial enlargement  Anterior infarct , possibly acute  T wave abnormality, consider lateral ischemia  ** ** ACUTE MI / STEMI ** **  Abnormal ECG  No previous ECGs available         No intake or output data in the 24 hours ending 09/24/20 2321      Cardiographics:     EKG Results     Procedure 720 Value Units Date/Time    EKG, 12 LEAD, INITIAL [[id number]] Collected:  09/24/20 2144    Order Status:  Completed Updated:  09/24/20 2220     Ventricular Rate 92 BPM      Atrial Rate 92 BPM      P-R Interval 148 ms      QRS Duration 100 ms      Q-T Interval 382 ms      QTC Calculation (Bezet) 472 ms      Calculated P Axis 62 degrees      Calculated R Axis -5 degrees Calculated T Axis 137 degrees      Diagnosis --     Normal sinus rhythm  Possible Left atrial enlargement  Anterior infarct , possibly acute  T wave abnormality, consider lateral ischemia  ** ** ACUTE MI / STEMI ** **  Abnormal ECG  No previous ECGs available      Initial ECG [[id number]]     Order Status:  Sent         XR Results (most recent):  No results found for this or any previous visit.     EKG: Sinus rhythm ST elevation in V1 to V4 consistent with acute anterior STEMI                Signed By: Sacha Llanos MD     September 24, 2020

## 2020-09-26 LAB
ATRIAL RATE: 87 BPM
CALCULATED P AXIS, ECG09: 61 DEGREES
CALCULATED R AXIS, ECG10: -14 DEGREES
CALCULATED T AXIS, ECG11: 149 DEGREES
DIAGNOSIS, 93000: NORMAL
P-R INTERVAL, ECG05: 150 MS
Q-T INTERVAL, ECG07: 388 MS
QRS DURATION, ECG06: 104 MS
QTC CALCULATION (BEZET), ECG08: 466 MS
VENTRICULAR RATE, ECG03: 87 BPM

## 2020-10-04 PROBLEM — I50.22 HEART FAILURE, CHRONIC SYSTOLIC (HCC): Status: ACTIVE | Noted: 2020-10-04

## 2020-10-13 PROBLEM — I63.9 CVA (CEREBRAL VASCULAR ACCIDENT) (HCC): Status: ACTIVE | Noted: 2020-10-13

## 2020-11-16 ENCOUNTER — TELEPHONE (OUTPATIENT)
Dept: CARDIAC REHAB | Age: 65
End: 2020-11-16

## 2020-11-16 NOTE — TELEPHONE ENCOUNTER
[facility] called patient and left a message about the program. Additional attempts at contact will be made.     Thank you,  Avani Tom

## 2020-12-01 ENCOUNTER — TELEPHONE (OUTPATIENT)
Dept: CARDIAC REHAB | Age: 65
End: 2020-12-01

## 2020-12-01 NOTE — TELEPHONE ENCOUNTER
[facility] called patient and left a message about the program. Additional attempts at contact will be made.     Thank you,  Sheba Looney

## 2020-12-15 ENCOUNTER — TELEPHONE (OUTPATIENT)
Dept: CARDIAC REHAB | Age: 65
End: 2020-12-15

## 2020-12-15 NOTE — TELEPHONE ENCOUNTER
[facility] called patient and left a message about the program. This is the third attempt at contact with no response, so no additional attempts at contact will be made.     Thank you,  Avtar Saenz

## 2022-01-21 NOTE — LETTER
[phone]  [facility] [place], [place]  [phone]  INFORMED CONSENT FOR TRANSFUSION OF BLOOD OR BLOOD PRODUCTS  My physician has informed me of the nature, purpose, benefits and risks of transfusion for blood and blood components that What Type Of Note Output Would You Prefer (Optional)?: Bullet Format Is This A New Presentation, Or A Follow-Up?: Skin Lesions ______________________________________________  (Signature of Patient)                                                            (Responsible party in case of Minor, Additional History: Nothing bleeding and no symptoms to the area.
